# Patient Record
Sex: FEMALE | Race: WHITE | NOT HISPANIC OR LATINO | Employment: PART TIME | URBAN - METROPOLITAN AREA
[De-identification: names, ages, dates, MRNs, and addresses within clinical notes are randomized per-mention and may not be internally consistent; named-entity substitution may affect disease eponyms.]

---

## 2018-03-29 ENCOUNTER — APPOINTMENT (OUTPATIENT)
Dept: RADIOLOGY | Age: 33
End: 2018-03-29
Attending: FAMILY MEDICINE
Payer: COMMERCIAL

## 2018-03-29 ENCOUNTER — APPOINTMENT (OUTPATIENT)
Dept: LAB | Age: 33
End: 2018-03-29
Attending: FAMILY MEDICINE
Payer: COMMERCIAL

## 2018-03-29 ENCOUNTER — OFFICE VISIT (OUTPATIENT)
Dept: FAMILY MEDICINE | Facility: CLINIC | Age: 33
End: 2018-03-29
Payer: COMMERCIAL

## 2018-03-29 VITALS
HEIGHT: 67 IN | TEMPERATURE: 98 F | BODY MASS INDEX: 29.35 KG/M2 | HEART RATE: 67 BPM | DIASTOLIC BLOOD PRESSURE: 88 MMHG | OXYGEN SATURATION: 99 % | WEIGHT: 187 LBS | RESPIRATION RATE: 16 BRPM | SYSTOLIC BLOOD PRESSURE: 134 MMHG

## 2018-03-29 DIAGNOSIS — R63.5 WEIGHT GAIN: ICD-10-CM

## 2018-03-29 DIAGNOSIS — E04.2 MULTIPLE THYROID NODULES: Primary | ICD-10-CM

## 2018-03-29 DIAGNOSIS — Z00.00 PHYSICAL EXAM, ANNUAL: ICD-10-CM

## 2018-03-29 DIAGNOSIS — R53.83 FATIGUE, UNSPECIFIED TYPE: ICD-10-CM

## 2018-03-29 LAB
ALBUMIN SERPL-MCNC: 4.1 G/DL (ref 3.4–5)
ALP SERPL-CCNC: 59 IU/L (ref 35–126)
ALT SERPL-CCNC: 30 IU/L (ref 11–54)
ANION GAP SERPL CALC-SCNC: 7 MEQ/L (ref 3–15)
AST SERPL-CCNC: 24 IU/L (ref 15–41)
BASOPHILS # BLD: 0.05 K/UL (ref 0.01–0.1)
BASOPHILS NFR BLD: 0.7 %
BILIRUB SERPL-MCNC: 0.6 MG/DL (ref 0.3–1.2)
BILIRUB UR QL STRIP.AUTO: NEGATIVE MG/DL
BUN SERPL-MCNC: 11 MG/DL (ref 8–20)
CALCIUM SERPL-MCNC: 9.3 MG/DL (ref 8.9–10.3)
CHLORIDE SERPL-SCNC: 102 MMOL/L (ref 98–109)
CHOLEST SERPL-MCNC: 149 MG/DL
CLARITY UR REFRACT.AUTO: CLEAR
CO2 SERPL-SCNC: 26 MMOL/L (ref 22–32)
COLOR UR AUTO: YELLOW
CREAT SERPL-MCNC: 0.9 MG/DL (ref 0.6–1.1)
EOSINOPHIL # BLD: 0.11 K/UL (ref 0.04–0.36)
EOSINOPHIL NFR BLD: 1.5 %
ERYTHROCYTE [DISTWIDTH] IN BLOOD BY AUTOMATED COUNT: 13.8 % (ref 11.7–14.4)
GFR SERPL CREATININE-BSD FRML MDRD: >60 ML/MIN/1.73M*2
GLUCOSE SERPL-MCNC: 89 MG/DL (ref 70–99)
GLUCOSE UR STRIP.AUTO-MCNC: NEGATIVE MG/DL
HCT VFR BLDCO AUTO: 42.3 % (ref 35–45)
HDLC SERPL-MCNC: 51 MG/DL
HDLC SERPL: 2.9 {RATIO}
HGB BLD-MCNC: 13.4 G/DL (ref 11.8–15.7)
HGB UR QL STRIP.AUTO: NEGATIVE
IMM GRANULOCYTES # BLD AUTO: 0.03 K/UL (ref 0–0.08)
IMM GRANULOCYTES NFR BLD AUTO: 0.4 %
KETONES UR STRIP.AUTO-MCNC: NEGATIVE MG/DL
LDLC SERPL CALC-MCNC: 83 MG/DL
LEUKOCYTE ESTERASE UR QL STRIP.AUTO: NEGATIVE
LYMPHOCYTES # BLD: 1.94 K/UL (ref 1.2–3.5)
LYMPHOCYTES NFR BLD: 26.6 %
MCH RBC QN AUTO: 28.5 PG (ref 28–33.2)
MCHC RBC AUTO-ENTMCNC: 31.7 G/DL (ref 32.2–35.5)
MCV RBC AUTO: 89.8 FL (ref 83–98)
MONOCYTES # BLD: 0.58 K/UL (ref 0.28–0.8)
MONOCYTES NFR BLD: 8 %
NEUTROPHILS # BLD: 4.57 K/UL (ref 1.7–7)
NEUTS SEG NFR BLD: 62.8 %
NITRITE UR QL STRIP.AUTO: NEGATIVE
NONHDLC SERPL-MCNC: 98 MG/DL
NRBC BLD-RTO: 0 %
PDW BLD AUTO: 10.8 FL (ref 9.4–12.3)
PH UR STRIP.AUTO: 6.5 [PH]
PLATELET # BLD AUTO: 312 K/UL (ref 150–369)
POTASSIUM SERPL-SCNC: 4.2 MMOL/L (ref 3.6–5.1)
PROT SERPL-MCNC: 6.9 G/DL (ref 6–8.2)
PROT UR QL STRIP.AUTO: NEGATIVE
RBC # BLD AUTO: 4.71 M/UL (ref 3.93–5.22)
SODIUM SERPL-SCNC: 135 MMOL/L (ref 136–144)
SP GR UR REFRACT.AUTO: 1.01
T3 SERPL-MCNC: 113 NG/ML (ref 87–178)
T4 FREE SERPL-MCNC: 0.93 NG/DL (ref 0.58–1.64)
TRIGL SERPL-MCNC: 73 MG/DL (ref 30–149)
TSH SERPL DL<=0.05 MIU/L-ACNC: 1.8 MIU/ML (ref 0.34–5.6)
UROBILINOGEN UR STRIP-ACNC: 0.2 EU/DL
WBC # BLD AUTO: 7.28 K/UL (ref 3.8–10.5)

## 2018-03-29 PROCEDURE — 84443 ASSAY THYROID STIM HORMONE: CPT | Performed by: FAMILY MEDICINE

## 2018-03-29 PROCEDURE — 80061 LIPID PANEL: CPT | Performed by: FAMILY MEDICINE

## 2018-03-29 PROCEDURE — 80053 COMPREHEN METABOLIC PANEL: CPT | Performed by: FAMILY MEDICINE

## 2018-03-29 PROCEDURE — 76536 US EXAM OF HEAD AND NECK: CPT

## 2018-03-29 PROCEDURE — 36415 COLL VENOUS BLD VENIPUNCTURE: CPT | Performed by: FAMILY MEDICINE

## 2018-03-29 PROCEDURE — 85025 COMPLETE CBC W/AUTO DIFF WBC: CPT | Performed by: FAMILY MEDICINE

## 2018-03-29 PROCEDURE — 81003 URINALYSIS AUTO W/O SCOPE: CPT | Performed by: FAMILY MEDICINE

## 2018-03-29 PROCEDURE — 99214 OFFICE O/P EST MOD 30 MIN: CPT | Performed by: FAMILY MEDICINE

## 2018-03-29 PROCEDURE — 84439 ASSAY OF FREE THYROXINE: CPT | Performed by: FAMILY MEDICINE

## 2018-03-29 PROCEDURE — 84480 ASSAY TRIIODOTHYRONINE (T3): CPT | Performed by: FAMILY MEDICINE

## 2018-03-29 RX ORDER — NORETHINDRONE ACETATE AND ETHINYL ESTRADIOL 1MG-20(21)
KIT ORAL
COMMUNITY
Start: 2015-01-09 | End: 2023-01-09

## 2018-03-29 RX ORDER — TOBRAMYCIN 3 MG/ML
0.3 SOLUTION/ DROPS OPHTHALMIC
COMMUNITY
Start: 2017-01-03 | End: 2023-01-09

## 2018-03-30 ENCOUNTER — TELEPHONE (OUTPATIENT)
Dept: FAMILY MEDICINE | Facility: CLINIC | Age: 33
End: 2018-03-30

## 2018-03-30 ASSESSMENT — ENCOUNTER SYMPTOMS
CHILLS: 0
PALPITATIONS: 0
SLEEP DISTURBANCE: 1
ABDOMINAL PAIN: 0
DYSURIA: 0
HEADACHES: 0
APPETITE CHANGE: 0
ARTHRALGIAS: 1
DIARRHEA: 0
SHORTNESS OF BREATH: 0
NERVOUS/ANXIOUS: 1
CONSTIPATION: 0
NEUROLOGICAL NEGATIVE: 1
FATIGUE: 1
FEVER: 0
COUGH: 0
JOINT SWELLING: 1
BACK PAIN: 1

## 2018-03-30 NOTE — TELEPHONE ENCOUNTER
----- Message from Jerson Arizmendi DO sent at 3/30/2018 11:17 AM EDT -----  Thyroid ultrasound again shows no change.  No follow-up needed.

## 2018-03-30 NOTE — TELEPHONE ENCOUNTER
----- Message from Jerson Arizmendi DO sent at 3/30/2018 11:18 AM EDT -----  Lab including thyroid panel was completely normal.

## 2018-03-30 NOTE — PROGRESS NOTES
"Subjective      Patient ID: Lupe Segal is a 32 y.o. female.    She returns today with multiple issues significant stress.  She complains of fatigue, feeling rundown and her hair is thinning.  She has suffered multiple joint problems was recently dislocated her left hip.  She has not dislocated her knee she was 11 years old numerous problems since.  She has seen rheumatology past.  Of interest and of concern is that her daughter is 4 years old was just diagnosed with Ehler-Danlos Syndrome and is under the care of physicians at [facility].  She has known multiple thyroid nodules she has seen endocrinology.        The following have been reviewed and updated as appropriate in this visit:       Review of Systems   Constitutional: Positive for fatigue. Negative for appetite change, chills and fever.   HENT: Negative.    Eyes: Negative for visual disturbance.   Respiratory: Negative for cough and shortness of breath.    Cardiovascular: Negative for chest pain and palpitations.   Gastrointestinal: Negative for abdominal pain, constipation and diarrhea.   Genitourinary: Negative for dysuria.   Musculoskeletal: Positive for arthralgias, back pain and joint swelling.   Skin: Negative for rash.   Neurological: Negative.  Negative for headaches.   Psychiatric/Behavioral: Positive for sleep disturbance. The patient is nervous/anxious.        Objective     Vitals:    03/29/18 0853   BP: 134/88   BP Location: Right upper arm   Patient Position: Sitting   Pulse: 67   Resp: 16   Temp: 36.7 °C (98 °F)   TempSrc: Oral   SpO2: 99%   Weight: 84.8 kg (187 lb)   Height: 1.702 m (5' 7\")     Body mass index is 29.29 kg/m².    Physical Exam   Constitutional: She is oriented to person, place, and time. She appears well-developed and well-nourished.   HENT:   Head: Normocephalic.   Eyes: Conjunctivae are normal.   Neck: Neck supple. No thyromegaly present.   Cardiovascular: Normal rate, regular rhythm and normal heart sounds.  "   Pulmonary/Chest: Effort normal and breath sounds normal.   Musculoskeletal: She exhibits no edema.   Lymphadenopathy:     She has no cervical adenopathy.   Neurological: She is alert and oriented to person, place, and time.   Skin: Skin is warm and dry.   Psychiatric: She has a normal mood and affect. Her behavior is normal. Judgment and thought content normal.   Nursing note and vitals reviewed.      Assessment/Plan   Problem List Items Addressed This Visit     None      Visit Diagnoses     Multiple thyroid nodules    -  Primary  Rec repeat Us.  If still no change, no further eval needed.    Relevant Orders    ULTRASOUND THYROID (Completed)    Urinalysis with Reflex Culture (Completed)    T3 (Completed)    T4, free (Completed)    UA Reflex to Culture (Macroscopic) (Completed)    Weight gain    Stress is a big factor.  Rec consult with  at [place] to see what services are available to her for her likely dx of EDS as well.        Fatigue, unspecified type        Physical exam, annual        Relevant Orders    CBC and differential (Completed)    Comprehensive metabolic panel (Completed)    Lipid panel (Completed)    TSH (Completed)    Urinalysis with Reflex Culture (Completed)    T3 (Completed)    T4, free (Completed)    CBC (Completed)    Diff Count (Completed)    UA Reflex to Culture (Macroscopic) (Completed)

## 2021-03-31 DIAGNOSIS — Z23 ENCOUNTER FOR IMMUNIZATION: ICD-10-CM

## 2021-07-23 ENCOUNTER — OFFICE VISIT (OUTPATIENT)
Dept: FAMILY MEDICINE | Facility: CLINIC | Age: 36
End: 2021-07-23
Payer: COMMERCIAL

## 2021-07-23 VITALS
HEIGHT: 67 IN | OXYGEN SATURATION: 97 % | RESPIRATION RATE: 18 BRPM | WEIGHT: 180.6 LBS | BODY MASS INDEX: 28.35 KG/M2 | TEMPERATURE: 97.5 F | DIASTOLIC BLOOD PRESSURE: 84 MMHG | SYSTOLIC BLOOD PRESSURE: 122 MMHG | HEART RATE: 75 BPM

## 2021-07-23 DIAGNOSIS — Z00.00 ENCOUNTER FOR GENERAL ADULT MEDICAL EXAMINATION WITHOUT ABNORMAL FINDINGS: Primary | ICD-10-CM

## 2021-07-23 DIAGNOSIS — Z86.39 HISTORY OF THYROID NODULE: ICD-10-CM

## 2021-07-23 DIAGNOSIS — Q79.60 EHLERS-DANLOS SYNDROME: ICD-10-CM

## 2021-07-23 PROCEDURE — 99395 PREV VISIT EST AGE 18-39: CPT | Performed by: FAMILY MEDICINE

## 2021-07-23 PROCEDURE — 3008F BODY MASS INDEX DOCD: CPT | Performed by: FAMILY MEDICINE

## 2021-07-23 ASSESSMENT — ENCOUNTER SYMPTOMS
LIGHT-HEADEDNESS: 0
ARTHRALGIAS: 0
COLOR CHANGE: 0
CHEST TIGHTNESS: 0
DIZZINESS: 0
RHINORRHEA: 0
WHEEZING: 0
FEVER: 0
DIARRHEA: 0
BACK PAIN: 0
SHORTNESS OF BREATH: 0
ABDOMINAL PAIN: 0
DIFFICULTY URINATING: 0
SINUS PRESSURE: 0
FATIGUE: 0
VOMITING: 0
HEADACHES: 0
SORE THROAT: 0
APPETITE CHANGE: 0
NAUSEA: 0

## 2021-07-23 NOTE — PATIENT INSTRUCTIONS
Patient Education     Preventive Care 21-39 Years Old, Female  Preventive care refers to visits with your health care provider and lifestyle choices that can promote health and wellness. This includes:  · A yearly physical exam. This may also be called an annual well check.  · Regular dental visits and eye exams.  · Immunizations.  · Screening for certain conditions.  · Healthy lifestyle choices, such as eating a healthy diet, getting regular exercise, not using drugs or products that contain nicotine and tobacco, and limiting alcohol use.  What can I expect for my preventive care visit?  Physical exam  Your health care provider will check your:  · Height and weight. This may be used to calculate body mass index (BMI), which tells if you are at a healthy weight.  · Heart rate and blood pressure.  · Skin for abnormal spots.  Counseling  Your health care provider may ask you questions about your:  · Alcohol, tobacco, and drug use.  · Emotional well-being.  · Home and relationship well-being.  · Sexual activity.  · Eating habits.  · Work and work environment.  · Method of birth control.  · Menstrual cycle.  · Pregnancy history.  What immunizations do I need?    Influenza (flu) vaccine  · This is recommended every year.  Tetanus, diphtheria, and pertussis (Tdap) vaccine  · You may need a Td booster every 10 years.  Varicella (chickenpox) vaccine  · You may need this if you have not been vaccinated.  Human papillomavirus (HPV) vaccine  · If recommended by your health care provider, you may need three doses over 6 months.  Measles, mumps, and rubella (MMR) vaccine  · You may need at least one dose of MMR. You may also need a second dose.  Meningococcal conjugate (MenACWY) vaccine  · One dose is recommended if you are age 19-21 years and a first-year college student living in a residence harding, or if you have one of several medical conditions. You may also need additional booster doses.  Pneumococcal conjugate (PCV13)  vaccine  · You may need this if you have certain conditions and were not previously vaccinated.  Pneumococcal polysaccharide (PPSV23) vaccine  · You may need one or two doses if you smoke cigarettes or if you have certain conditions.  Hepatitis A vaccine  · You may need this if you have certain conditions or if you travel or work in places where you may be exposed to hepatitis A.  Hepatitis B vaccine  · You may need this if you have certain conditions or if you travel or work in places where you may be exposed to hepatitis B.  Haemophilus influenzae type b (Hib) vaccine  · You may need this if you have certain conditions.  You may receive vaccines as individual doses or as more than one vaccine together in one shot (combination vaccines). Talk with your health care provider about the risks and benefits of combination vaccines.  What tests do I need?    Blood tests  · Lipid and cholesterol levels. These may be checked every 5 years starting at age 20.  · Hepatitis C test.  · Hepatitis B test.  Screening  · Diabetes screening. This is done by checking your blood sugar (glucose) after you have not eaten for a while (fasting).  · Sexually transmitted disease (STD) testing.  · BRCA-related cancer screening. This may be done if you have a family history of breast, ovarian, tubal, or peritoneal cancers.  · Pelvic exam and Pap test. This may be done every 3 years starting at age 21. Starting at age 30, this may be done every 5 years if you have a Pap test in combination with an HPV test.  Talk with your health care provider about your test results, treatment options, and if necessary, the need for more tests.  Follow these instructions at home:  Eating and drinking    · Eat a diet that includes fresh fruits and vegetables, whole grains, lean protein, and low-fat dairy.  · Take vitamin and mineral supplements as recommended by your health care provider.  · Do not drink alcohol if:  ? Your health care provider tells you not  to drink.  ? You are pregnant, may be pregnant, or are planning to become pregnant.  · If you drink alcohol:  ? Limit how much you have to 0-1 drink a day.  ? Be aware of how much alcohol is in your drink. In the [place] one drink equals one 12 oz bottle of beer (355 mL), one 5 oz glass of wine (148 mL), or one 1½ oz glass of hard liquor (44 mL).  Lifestyle  · Take daily care of your teeth and gums.  · Stay active. Exercise for at least 30 minutes on 5 or more days each week.  · Do not use any products that contain nicotine or tobacco, such as cigarettes, e-cigarettes, and chewing tobacco. If you need help quitting, ask your health care provider.  · If you are sexually active, practice safe sex. Use a condom or other form of birth control (contraception) in order to prevent pregnancy and STIs (sexually transmitted infections). If you plan to become pregnant, see your health care provider for a preconception visit.  What's next?  · Visit your health care provider once a year for a well check visit.  · Ask your health care provider how often you should have your eyes and teeth checked.  · Stay up to date on all vaccines.  This information is not intended to replace advice given to you by your health care provider. Make sure you discuss any questions you have with your health care provider.  Document Released: 02/13/2003 Document Revised: 08/29/2019 Document Reviewed: 08/29/2019  [employer] Patient Education © 2020 [employer]

## 2021-07-23 NOTE — PROGRESS NOTES
[facility]  [address] [place] [address]  [phone]     Reason for visit:   Chief Complaint   Patient presents with   • Annual Exam     PE, overall health       HPI   Lupe Segal is a 36 y.o. female who presents for a routine well visit.        Employed- RN  Fun activities - spending time with family. 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 Unable to plan her meals.  Doesn't smoke, no smokers at home.  Alcohol - socially  Drugs - no   Menses - regular  Sleep - 7 hours of sleep  Last dental visit - overdue  Ophthalmology - overdue  Seatbelt use - yes  Sexually active - yes  Mammogram - paternal aunt diagnosed at late 60s  Colonoscopy - No risk factors. Screening starts at 49yo.  Cervical Cancer Screening - routine follow up OB/GYN    History of EDS - officially diagnosed 3 years ago.     Social History:  Social History     Social History Narrative   • Not on file       Medical History:  Past Medical History:   Diagnosis Date   • Shala-Danlos syndrome        Surgical History:  Past Surgical History:   Procedure Laterality Date   • KNEE LIGAMENT RECONSTRUCTION     • WISDOM TOOTH EXTRACTION           Family History:  History reviewed. No pertinent family history.    Allergies:  Azithromycin, Codeine, and Morphine    Current Medications:  Current Outpatient Medications   Medication Sig Dispense Refill   • multivitamin-Ca-iron-minerals tablet Take 1 tablet by mouth daily.     • norethindrone-e.estradiol-iron (LOESTRIN FE 1/20, 28-DAY,) 1 mg-20 mcg (21)/75 mg (7) per tablet take 1 tablet by oral route  every day     • prenat.vits,jack,min-iron-folic (PRENATAL VITAMIN) tablet take 1 tablet by oral route  every day     • tobramycin (TOBREX) 0.3 % ophthalmic solution 0.3 %.       No current facility-administered medications for this visit.       Review of Systems:  Review of Systems    Constitutional: Negative for appetite change, fatigue and fever.   HENT: Negative for hearing loss, rhinorrhea, sinus pressure and sore throat.    Eyes: Negative for visual disturbance.   Respiratory: Negative for chest tightness, shortness of breath and wheezing.    Cardiovascular: Negative for chest pain.   Gastrointestinal: Negative for abdominal pain, diarrhea, nausea and vomiting.   Genitourinary: Negative for difficulty urinating.   Musculoskeletal: Negative for arthralgias and back pain.   Skin: Negative for color change.   Neurological: Negative for dizziness, light-headedness and headaches.   Psychiatric/Behavioral: Negative for behavioral problems.       Objective     Vital Signs:  Vitals:    07/23/21 0928   BP: 122/84   Pulse: 75   Resp: 18   Temp: 36.4 °C (97.5 °F)   SpO2: 97%       BMI:  Body mass index is 28.29 kg/m².     Physical Exam  Vitals and nursing note reviewed.   Constitutional:       Appearance: She is well-developed.   HENT:      Head: Normocephalic and atraumatic.      Right Ear: Tympanic membrane, ear canal and external ear normal.      Left Ear: Tympanic membrane, ear canal and external ear normal.   Eyes:      Pupils: Pupils are equal, round, and reactive to light.   Cardiovascular:      Rate and Rhythm: Normal rate and regular rhythm.      Heart sounds: Normal heart sounds. No murmur heard.   No friction rub.   Pulmonary:      Effort: Pulmonary effort is normal. No respiratory distress.      Breath sounds: Normal breath sounds. No wheezing or rales.   Abdominal:      General: Bowel sounds are normal.      Palpations: Abdomen is soft.      Tenderness: There is no abdominal tenderness. There is no guarding.   Musculoskeletal:         General: Normal range of motion.      Cervical back: Normal range of motion and neck supple.      Right lower leg: No edema.      Left lower leg: No edema.   Lymphadenopathy:      Cervical: No cervical adenopathy.   Skin:     General: Skin is warm and dry.    Neurological:      Mental Status: She is alert and oriented to person, place, and time.   Psychiatric:         Behavior: Behavior normal.         Recent labs before today:     Lab Results   Component Value Date    WBC 7.28 03/29/2018    HGB 13.4 03/29/2018    HCT 42.3 03/29/2018     03/29/2018    CHOL 149 03/29/2018    TRIG 73 03/29/2018    HDL 51 (L) 03/29/2018    ALT 30 03/29/2018    AST 24 03/29/2018     (L) 03/29/2018    K 4.2 03/29/2018     03/29/2018    CREATININE 0.9 03/29/2018    BUN 11 03/29/2018    CO2 26 03/29/2018    TSH 1.80 03/29/2018        Procedures   Assessment     Patient Instructions     Patient Education     Preventive Care 21-39 Years Old, Female  Preventive care refers to visits with your health care provider and lifestyle choices that can promote health and wellness. This includes:  · A yearly physical exam. This may also be called an annual well check.  · Regular dental visits and eye exams.  · Immunizations.  · Screening for certain conditions.  · Healthy lifestyle choices, such as eating a healthy diet, getting regular exercise, not using drugs or products that contain nicotine and tobacco, and limiting alcohol use.  What can I expect for my preventive care visit?  Physical exam  Your health care provider will check your:  · Height and weight. This may be used to calculate body mass index (BMI), which tells if you are at a healthy weight.  · Heart rate and blood pressure.  · Skin for abnormal spots.  Counseling  Your health care provider may ask you questions about your:  · Alcohol, tobacco, and drug use.  · Emotional well-being.  · Home and relationship well-being.  · Sexual activity.  · Eating habits.  · Work and work environment.  · Method of birth control.  · Menstrual cycle.  · Pregnancy history.  What immunizations do I need?    Influenza (flu) vaccine  · This is recommended every year.  Tetanus, diphtheria, and pertussis (Tdap) vaccine  · You may need a Td  booster every 10 years.  Varicella (chickenpox) vaccine  · You may need this if you have not been vaccinated.  Human papillomavirus (HPV) vaccine  · If recommended by your health care provider, you may need three doses over 6 months.  Measles, mumps, and rubella (MMR) vaccine  · You may need at least one dose of MMR. You may also need a second dose.  Meningococcal conjugate (MenACWY) vaccine  · One dose is recommended if you are age 19-21 years and a first-year college student living in a residence harding, or if you have one of several medical conditions. You may also need additional booster doses.  Pneumococcal conjugate (PCV13) vaccine  · You may need this if you have certain conditions and were not previously vaccinated.  Pneumococcal polysaccharide (PPSV23) vaccine  · You may need one or two doses if you smoke cigarettes or if you have certain conditions.  Hepatitis A vaccine  · You may need this if you have certain conditions or if you travel or work in places where you may be exposed to hepatitis A.  Hepatitis B vaccine  · You may need this if you have certain conditions or if you travel or work in places where you may be exposed to hepatitis B.  Haemophilus influenzae type b (Hib) vaccine  · You may need this if you have certain conditions.  You may receive vaccines as individual doses or as more than one vaccine together in one shot (combination vaccines). Talk with your health care provider about the risks and benefits of combination vaccines.  What tests do I need?    Blood tests  · Lipid and cholesterol levels. These may be checked every 5 years starting at age 20.  · Hepatitis C test.  · Hepatitis B test.  Screening  · Diabetes screening. This is done by checking your blood sugar (glucose) after you have not eaten for a while (fasting).  · Sexually transmitted disease (STD) testing.  · BRCA-related cancer screening. This may be done if you have a family history of breast, ovarian, tubal, or peritoneal  cancers.  · Pelvic exam and Pap test. This may be done every 3 years starting at age 21. Starting at age 30, this may be done every 5 years if you have a Pap test in combination with an HPV test.  Talk with your health care provider about your test results, treatment options, and if necessary, the need for more tests.  Follow these instructions at home:  Eating and drinking    · Eat a diet that includes fresh fruits and vegetables, whole grains, lean protein, and low-fat dairy.  · Take vitamin and mineral supplements as recommended by your health care provider.  · Do not drink alcohol if:  ? Your health care provider tells you not to drink.  ? You are pregnant, may be pregnant, or are planning to become pregnant.  · If you drink alcohol:  ? Limit how much you have to 0-1 drink a day.  ? Be aware of how much alcohol is in your drink. In the [place] one drink equals one 12 oz bottle of beer (355 mL), one 5 oz glass of wine (148 mL), or one 1½ oz glass of hard liquor (44 mL).  Lifestyle  · Take daily care of your teeth and gums.  · Stay active. Exercise for at least 30 minutes on 5 or more days each week.  · Do not use any products that contain nicotine or tobacco, such as cigarettes, e-cigarettes, and chewing tobacco. If you need help quitting, ask your health care provider.  · If you are sexually active, practice safe sex. Use a condom or other form of birth control (contraception) in order to prevent pregnancy and STIs (sexually transmitted infections). If you plan to become pregnant, see your health care provider for a preconception visit.  What's next?  · Visit your health care provider once a year for a well check visit.  · Ask your health care provider how often you should have your eyes and teeth checked.  · Stay up to date on all vaccines.  This information is not intended to replace advice given to you by your health care provider. Make sure you discuss any questions you have with your health care  provider.  Document Released: 02/13/2003 Document Revised: 08/29/2019 Document Reviewed: 08/29/2019  [employer] Patient Education © 2020 [employer]           Problem List Items Addressed This Visit        Endocrine/Metabolic    Shala-Danlos syndrome     Stable. referred to cardiology for annual check up. she follows up with ophthalmologist and annaul eye exams. Follows up with ortho for scoliosis         Relevant Orders    Ambulatory referral to Cardiology       Other    History of thyroid nodule     will check thryoid panel         Relevant Orders    TSH w reflex FT4      Other Visit Diagnoses     Encounter for general adult medical examination without abnormal findings    -  Primary    Relevant Orders    Comprehensive metabolic panel    CBC and Differential    Lipid panel    TSH w reflex FT4      Today we discussed a healthy diet with fruits, vegetables, and low-fat items with a focus on complex carbohydrates. Regular physical activity is encouraged with a goal of 30 minutes of cardio most days of the week with some muscle strengthening.    Try to limit caffeine to less than 24 ounces per day, replenish with plenty of water. Hydration has a bigger impact on your health than you think!    Reviewed safe alcohol habits. If you drink while out of the house, plan for a designated . Never drink and drive. Avoid THC use, smoking, or vaping.    Reviewed safe sexual practices, if sexually active always use a condom to discourage STD transmission.     Routine assessments by specialists such as OBGYN, cardiology, GI, etc. were encouraged if applicable.     Watch for changes in bowel habits, especially black or bloody stools.     Patient is UTD with immunizations.  Encouraged yearly flu shot in Oct/Nov.     Fasting labs, follow up based on results.             Og Nina, DO  7/23/2021

## 2021-07-23 NOTE — ASSESSMENT & PLAN NOTE
Stable. referred to cardiology for annual check up. she follows up with ophthalmologist and annaul eye exams. Follows up with ortho for scoliosis

## 2021-09-20 ENCOUNTER — TELEPHONE (OUTPATIENT)
Dept: FAMILY MEDICINE | Facility: CLINIC | Age: 36
End: 2021-09-20

## 2021-09-20 DIAGNOSIS — E66.3 PATIENT OVERWEIGHT: Primary | ICD-10-CM

## 2021-09-20 NOTE — TELEPHONE ENCOUNTER
"Mera calling from [employer] [phone]  Fax [phone]    Asking for a \"script\" with dx code E66.3 for nutrition/dietician svs. Please call /[email or web address]  "

## 2021-10-06 ENCOUNTER — TRANSCRIBE ORDERS (OUTPATIENT)
Dept: LAB | Age: 36
End: 2021-10-06
Payer: COMMERCIAL

## 2021-10-06 ENCOUNTER — APPOINTMENT (OUTPATIENT)
Dept: LAB | Age: 36
End: 2021-10-06
Attending: FAMILY MEDICINE
Payer: COMMERCIAL

## 2021-10-06 DIAGNOSIS — L56.2 PHOTOCONTACT DERMATITIS (BERLOQUE DERMATITIS): Primary | ICD-10-CM

## 2021-10-06 DIAGNOSIS — Z86.39 HISTORY OF THYROID NODULE: ICD-10-CM

## 2021-10-06 DIAGNOSIS — Z00.00 ENCOUNTER FOR GENERAL ADULT MEDICAL EXAMINATION WITHOUT ABNORMAL FINDINGS: ICD-10-CM

## 2021-10-06 DIAGNOSIS — L56.2 PHOTOCONTACT DERMATITIS (BERLOQUE DERMATITIS): ICD-10-CM

## 2021-10-06 LAB
ALBUMIN SERPL-MCNC: 3.8 G/DL (ref 3.4–5)
ALP SERPL-CCNC: 55 IU/L (ref 35–126)
ALT SERPL-CCNC: 20 IU/L (ref 11–54)
ANION GAP SERPL CALC-SCNC: 11 MEQ/L (ref 3–15)
AST SERPL-CCNC: 19 IU/L (ref 15–41)
BASOPHILS # BLD: 0.06 K/UL (ref 0.01–0.1)
BASOPHILS NFR BLD: 0.9 %
BILIRUB SERPL-MCNC: 0.5 MG/DL (ref 0.3–1.2)
BUN SERPL-MCNC: 10 MG/DL (ref 8–20)
CALCIUM SERPL-MCNC: 9.9 MG/DL (ref 8.9–10.3)
CHLORIDE SERPL-SCNC: 101 MEQ/L (ref 98–109)
CHOLEST SERPL-MCNC: 162 MG/DL
CO2 SERPL-SCNC: 27 MEQ/L (ref 22–32)
CREAT SERPL-MCNC: 1 MG/DL (ref 0.6–1.1)
DIFFERENTIAL METHOD BLD: ABNORMAL
EOSINOPHIL # BLD: 0.1 K/UL (ref 0.04–0.36)
EOSINOPHIL NFR BLD: 1.5 %
ERYTHROCYTE [DISTWIDTH] IN BLOOD BY AUTOMATED COUNT: 12.8 % (ref 11.7–14.4)
GFR SERPL CREATININE-BSD FRML MDRD: >60 ML/MIN/1.73M*2
GLUCOSE SERPL-MCNC: 97 MG/DL (ref 70–99)
HCT VFR BLDCO AUTO: 44.5 % (ref 35–45)
HDLC SERPL-MCNC: 48 MG/DL
HDLC SERPL: 3.4 {RATIO}
HGB BLD-MCNC: 13.9 G/DL (ref 11.8–15.7)
IMM GRANULOCYTES # BLD AUTO: 0.02 K/UL (ref 0–0.08)
IMM GRANULOCYTES NFR BLD AUTO: 0.3 %
LDLC SERPL CALC-MCNC: 105 MG/DL
LYMPHOCYTES # BLD: 1.61 K/UL (ref 1.2–3.5)
LYMPHOCYTES NFR BLD: 24.3 %
MCH RBC QN AUTO: 27.9 PG (ref 28–33.2)
MCHC RBC AUTO-ENTMCNC: 31.2 G/DL (ref 32.2–35.5)
MCV RBC AUTO: 89.2 FL (ref 83–98)
MONOCYTES # BLD: 0.68 K/UL (ref 0.28–0.8)
MONOCYTES NFR BLD: 10.3 %
NEUTROPHILS # BLD: 4.15 K/UL (ref 1.7–7)
NEUTS SEG NFR BLD: 62.7 %
NONHDLC SERPL-MCNC: 114 MG/DL
NRBC BLD-RTO: 0 %
PDW BLD AUTO: 10.4 FL (ref 9.4–12.3)
PLATELET # BLD AUTO: 349 K/UL (ref 150–369)
POTASSIUM SERPL-SCNC: 4.8 MEQ/L (ref 3.6–5.1)
PROT SERPL-MCNC: 6.8 G/DL (ref 6–8.2)
RBC # BLD AUTO: 4.99 M/UL (ref 3.93–5.22)
SODIUM SERPL-SCNC: 139 MEQ/L (ref 136–144)
TRIGL SERPL-MCNC: 47 MG/DL (ref 30–149)
TSH SERPL DL<=0.05 MIU/L-ACNC: 1.08 MIU/L (ref 0.34–5.6)
WBC # BLD AUTO: 6.62 K/UL (ref 3.8–10.5)

## 2021-10-06 PROCEDURE — 86038 ANTINUCLEAR ANTIBODIES: CPT

## 2021-10-06 PROCEDURE — 82040 ASSAY OF SERUM ALBUMIN: CPT

## 2021-10-06 PROCEDURE — 84443 ASSAY THYROID STIM HORMONE: CPT

## 2021-10-06 PROCEDURE — 36415 COLL VENOUS BLD VENIPUNCTURE: CPT

## 2021-10-06 PROCEDURE — 85025 COMPLETE CBC W/AUTO DIFF WBC: CPT

## 2021-10-06 PROCEDURE — 80061 LIPID PANEL: CPT

## 2021-10-07 LAB — ANA SER QL IA: NEGATIVE

## 2023-01-09 ENCOUNTER — OFFICE VISIT (OUTPATIENT)
Dept: FAMILY MEDICINE | Facility: CLINIC | Age: 38
End: 2023-01-09
Payer: COMMERCIAL

## 2023-01-09 VITALS
BODY MASS INDEX: 29.13 KG/M2 | DIASTOLIC BLOOD PRESSURE: 86 MMHG | RESPIRATION RATE: 18 BRPM | OXYGEN SATURATION: 99 % | TEMPERATURE: 97.7 F | SYSTOLIC BLOOD PRESSURE: 124 MMHG | HEART RATE: 92 BPM | WEIGHT: 186 LBS

## 2023-01-09 DIAGNOSIS — Z00.00 ENCOUNTER FOR GENERAL ADULT MEDICAL EXAMINATION WITHOUT ABNORMAL FINDINGS: Primary | ICD-10-CM

## 2023-01-09 DIAGNOSIS — Q79.60 EHLERS-DANLOS SYNDROME: ICD-10-CM

## 2023-01-09 PROCEDURE — 99395 PREV VISIT EST AGE 18-39: CPT | Performed by: FAMILY MEDICINE

## 2023-01-09 PROCEDURE — 3008F BODY MASS INDEX DOCD: CPT | Performed by: FAMILY MEDICINE

## 2023-01-09 ASSESSMENT — ENCOUNTER SYMPTOMS
BACK PAIN: 0
CHEST TIGHTNESS: 0
FEVER: 0
COLOR CHANGE: 0
DIARRHEA: 0
SHORTNESS OF BREATH: 0
LIGHT-HEADEDNESS: 0
APPETITE CHANGE: 0
NAUSEA: 0
ARTHRALGIAS: 0
SORE THROAT: 0
DIFFICULTY URINATING: 0
VOMITING: 0
RHINORRHEA: 0
HEADACHES: 0
SINUS PRESSURE: 0
WHEEZING: 0
FATIGUE: 0
ABDOMINAL PAIN: 0
DIZZINESS: 0

## 2023-01-09 ASSESSMENT — PATIENT HEALTH QUESTIONNAIRE - PHQ9: SUM OF ALL RESPONSES TO PHQ9 QUESTIONS 1 & 2: 0

## 2023-01-09 NOTE — PROGRESS NOTES
[facility]  [address] [place] [address]  [phone]     Reason for visit:   Chief Complaint   Patient presents with   • Annual Exam     PE, no concerns       HPI   Lupe Segal is a 37 y.o. female who presents for a routine well visit.        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 Unable to plan her meals.  Doesn't smoke, no smokers at home.  Alcohol - socially  Drugs - no   Menses - regular  Sleep - 7 hours of sleep  Last dental visit - overdue  Ophthalmology - overdue  Seatbelt use - yes  Sexually active - yes  Mammogram - paternal aunt diagnosed at late 60s  Colonoscopy - No risk factors. Screening starts at 46yo.  Cervical Cancer Screening - routine follow up OB/GYN     History of EDS - officially diagnosed 4 years ago.        Social History     Socioeconomic History   • Marital status:      Spouse name: Not on file   • Number of children: 2   • Years of education: Not on file   • Highest education level: Not on file   Occupational History   • Not on file   Tobacco Use   • Smoking status: Never   • Smokeless tobacco: Never   Substance and Sexual Activity   • Alcohol use: Yes     Comment: occasionally   • Drug use: Never   • Sexual activity: Yes   Other Topics Concern   • Not on file   Social History Narrative   • Not on file     Social Determinants of Health     Financial Resource Strain: Not on file   Food Insecurity: Not on file   Transportation Needs: Not on file   Physical Activity: Not on file   Stress: Not on file   Social Connections: Not on file   Intimate Partner Violence: Not on file   Housing Stability: Not on file        Medical History:  Past Medical History:   Diagnosis Date   • Shala-Danlos syndrome        Surgical History:  Past Surgical History:   Procedure Laterality Date    • KNEE LIGAMENT RECONSTRUCTION     • WISDOM TOOTH EXTRACTION           Family History:  History reviewed. No pertinent family history.    Allergies:  Azithromycin, Codeine, and Morphine    Current Medications:  Current Outpatient Medications   Medication Sig Dispense Refill   • multivitamin-Ca-iron-minerals tablet Take 1 tablet by mouth daily.       No current facility-administered medications for this visit.       Review of Systems:  Review of Systems   Constitutional: Negative for appetite change, fatigue and fever.   HENT: Negative for hearing loss, rhinorrhea, sinus pressure and sore throat.    Eyes: Negative for visual disturbance.   Respiratory: Negative for chest tightness, shortness of breath and wheezing.    Cardiovascular: Negative for chest pain.   Gastrointestinal: Negative for abdominal pain, diarrhea, nausea and vomiting.   Genitourinary: Negative for difficulty urinating.   Musculoskeletal: Negative for arthralgias and back pain.   Skin: Negative for color change.   Neurological: Negative for dizziness, light-headedness and headaches.   Psychiatric/Behavioral: Negative for behavioral problems.       Objective     Vital Signs:  Vitals:    01/09/23 1204   BP: 124/86   Pulse: 92   Resp: 18   Temp: 36.5 °C (97.7 °F)   SpO2: 99%       BMI:  Body mass index is 29.13 kg/m².     Physical Exam  Vitals and nursing note reviewed.   Constitutional:       Appearance: She is well-developed.   HENT:      Head: Normocephalic and atraumatic.      Right Ear: Tympanic membrane, ear canal and external ear normal.      Left Ear: Tympanic membrane, ear canal and external ear normal.   Eyes:      Pupils: Pupils are equal, round, and reactive to light.   Cardiovascular:      Rate and Rhythm: Normal rate and regular rhythm.      Heart sounds: Normal heart sounds. No murmur heard.    No friction rub.   Pulmonary:      Effort: Pulmonary effort is normal. No respiratory distress.      Breath sounds: Normal breath sounds. No  wheezing or rales.   Abdominal:      General: Bowel sounds are normal.      Palpations: Abdomen is soft.      Tenderness: There is no abdominal tenderness. There is no guarding.   Musculoskeletal:         General: Normal range of motion.      Cervical back: Normal range of motion and neck supple.      Right lower leg: No edema.      Left lower leg: No edema.   Lymphadenopathy:      Cervical: No cervical adenopathy.   Skin:     General: Skin is warm and dry.   Neurological:      Mental Status: She is alert and oriented to person, place, and time.   Psychiatric:         Behavior: Behavior normal.         Recent labs before today:     Lab Results   Component Value Date    WBC 6.62 10/06/2021    HGB 13.9 10/06/2021    HCT 44.5 10/06/2021     10/06/2021    CHOL 162 10/06/2021    TRIG 47 10/06/2021    HDL 48 (L) 10/06/2021    ALT 20 10/06/2021    AST 19 10/06/2021     10/06/2021    K 4.8 10/06/2021     10/06/2021    CREATININE 1.0 10/06/2021    BUN 10 10/06/2021    CO2 27 10/06/2021    TSH 1.08 10/06/2021        Procedures   Assessment     There are no Patient Instructions on file for this visit.    Problem List Items Addressed This Visit        Musculoskeletal    Shala-Danlos syndrome     Stable. Cont to monitor symptoms        Other Visit Diagnoses     Encounter for general adult medical examination without abnormal findings    -  Primary    Relevant Orders    Comprehensive metabolic panel    CBC and Differential    Lipid panel              Today we discussed a healthy diet with fruits, vegetables, and low-fat items with a focus on complex carbohydrates. Regular physical activity is encouraged with a goal of 30 minutes of cardio most days of the week with some muscle strengthening.    Try to limit caffeine to less than 24 ounces per day, replenish with plenty of water. Hydration has a bigger impact on your health than you think!    Reviewed safe alcohol habits. If you drink while out of the house,  plan for a designated . Never drink and drive. Avoid THC use, smoking, or vaping.    Reviewed safe sexual practices, if sexually active always use a condom to discourage STD transmission.     Routine assessments by specialists such as OBGYN, cardiology, GI, etc. were encouraged if applicable.     Watch for changes in bowel habits, especially black or bloody stools.     Patient is UTD with immunizations.     Fasting labs, follow up One year for annual check up.          Og Nina, DO  1/9/2023

## 2023-01-18 ENCOUNTER — TELEPHONE (OUTPATIENT)
Dept: FAMILY MEDICINE | Facility: CLINIC | Age: 38
End: 2023-01-18

## 2023-01-18 NOTE — TELEPHONE ENCOUNTER
Pt started experiencing head cold symptoms the day after OV. Runny & stuffy nose, sore throat & congestion. Pt looking to get meds to help alleviate symptoms. Please assist.

## 2023-05-31 ENCOUNTER — APPOINTMENT (OUTPATIENT)
Dept: LAB | Age: 38
End: 2023-05-31
Attending: FAMILY MEDICINE
Payer: COMMERCIAL

## 2023-05-31 DIAGNOSIS — Z00.00 ENCOUNTER FOR GENERAL ADULT MEDICAL EXAMINATION WITHOUT ABNORMAL FINDINGS: ICD-10-CM

## 2023-05-31 LAB
ALBUMIN SERPL-MCNC: 3.8 G/DL (ref 3.4–5)
ALP SERPL-CCNC: 56 IU/L (ref 35–126)
ALT SERPL-CCNC: 21 IU/L (ref 11–54)
ANION GAP SERPL CALC-SCNC: 7 MEQ/L (ref 3–15)
AST SERPL-CCNC: 19 IU/L (ref 15–41)
BASOPHILS # BLD: 0.05 K/UL (ref 0.01–0.1)
BASOPHILS NFR BLD: 0.9 %
BILIRUB SERPL-MCNC: 0.4 MG/DL (ref 0.3–1.2)
BUN SERPL-MCNC: 11 MG/DL (ref 8–20)
CALCIUM SERPL-MCNC: 9.1 MG/DL (ref 8.9–10.3)
CHLORIDE SERPL-SCNC: 106 MEQ/L (ref 98–109)
CHOLEST SERPL-MCNC: 173 MG/DL
CO2 SERPL-SCNC: 26 MEQ/L (ref 22–32)
CREAT SERPL-MCNC: 0.7 MG/DL (ref 0.6–1.1)
DIFFERENTIAL METHOD BLD: ABNORMAL
EOSINOPHIL # BLD: 0.14 K/UL (ref 0.04–0.36)
EOSINOPHIL NFR BLD: 2.4 %
ERYTHROCYTE [DISTWIDTH] IN BLOOD BY AUTOMATED COUNT: 13.6 % (ref 11.7–14.4)
GFR SERPL CREATININE-BSD FRML MDRD: >60 ML/MIN/1.73M*2
GLUCOSE SERPL-MCNC: 85 MG/DL (ref 70–99)
HCT VFR BLDCO AUTO: 43.1 % (ref 35–45)
HDLC SERPL-MCNC: 61 MG/DL
HDLC SERPL: 2.8 {RATIO}
HGB BLD-MCNC: 13.6 G/DL (ref 11.8–15.7)
IMM GRANULOCYTES # BLD AUTO: 0.01 K/UL (ref 0–0.08)
IMM GRANULOCYTES NFR BLD AUTO: 0.2 %
LDLC SERPL CALC-MCNC: 104 MG/DL
LYMPHOCYTES # BLD: 1.84 K/UL (ref 1.2–3.5)
LYMPHOCYTES NFR BLD: 31.3 %
MCH RBC QN AUTO: 29.1 PG (ref 28–33.2)
MCHC RBC AUTO-ENTMCNC: 31.6 G/DL (ref 32.2–35.5)
MCV RBC AUTO: 92.3 FL (ref 83–98)
MONOCYTES # BLD: 0.57 K/UL (ref 0.28–0.8)
MONOCYTES NFR BLD: 9.7 %
NEUTROPHILS # BLD: 3.27 K/UL (ref 1.7–7)
NEUTS SEG NFR BLD: 55.5 %
NONHDLC SERPL-MCNC: 112 MG/DL
NRBC BLD-RTO: 0 %
PDW BLD AUTO: 10.9 FL (ref 9.4–12.3)
PLATELET # BLD AUTO: 329 K/UL (ref 150–369)
POTASSIUM SERPL-SCNC: 4.5 MEQ/L (ref 3.6–5.1)
PROT SERPL-MCNC: 6.5 G/DL (ref 6–8.2)
RBC # BLD AUTO: 4.67 M/UL (ref 3.93–5.22)
SODIUM SERPL-SCNC: 139 MEQ/L (ref 136–144)
TRIGL SERPL-MCNC: 42 MG/DL (ref 30–149)
WBC # BLD AUTO: 5.88 K/UL (ref 3.8–10.5)

## 2023-05-31 PROCEDURE — 80053 COMPREHEN METABOLIC PANEL: CPT

## 2023-05-31 PROCEDURE — 85025 COMPLETE CBC W/AUTO DIFF WBC: CPT

## 2023-05-31 PROCEDURE — 36415 COLL VENOUS BLD VENIPUNCTURE: CPT

## 2023-05-31 PROCEDURE — 80061 LIPID PANEL: CPT

## 2023-07-19 ENCOUNTER — OFFICE VISIT (OUTPATIENT)
Dept: FAMILY MEDICINE | Facility: CLINIC | Age: 38
End: 2023-07-19
Payer: COMMERCIAL

## 2023-07-19 VITALS
SYSTOLIC BLOOD PRESSURE: 132 MMHG | OXYGEN SATURATION: 99 % | TEMPERATURE: 97.8 F | DIASTOLIC BLOOD PRESSURE: 86 MMHG | RESPIRATION RATE: 18 BRPM | HEART RATE: 110 BPM | WEIGHT: 178.6 LBS | BODY MASS INDEX: 27.97 KG/M2

## 2023-07-19 DIAGNOSIS — F41.1 GENERALIZED ANXIETY DISORDER: Primary | ICD-10-CM

## 2023-07-19 DIAGNOSIS — F43.11 ACUTE POST-TRAUMATIC STRESS DISORDER: ICD-10-CM

## 2023-07-19 PROCEDURE — 99214 OFFICE O/P EST MOD 30 MIN: CPT | Performed by: FAMILY MEDICINE

## 2023-07-19 PROCEDURE — 3008F BODY MASS INDEX DOCD: CPT | Performed by: FAMILY MEDICINE

## 2023-07-19 RX ORDER — LORAZEPAM 1 MG/1
1 TABLET ORAL EVERY 6 HOURS PRN
Qty: 10 TABLET | Refills: 0 | Status: SHIPPED | OUTPATIENT
Start: 2023-07-19 | End: 2023-09-22

## 2023-07-19 RX ORDER — NORETHINDRONE ACETATE AND ETHINYL ESTRADIOL AND FERROUS FUMARATE 1MG-20(21)
KIT ORAL
COMMUNITY
Start: 2023-07-10

## 2023-07-19 ASSESSMENT — ENCOUNTER SYMPTOMS
CHEST TIGHTNESS: 0
DIARRHEA: 0
DECREASED CONCENTRATION: 0
NERVOUS/ANXIOUS: 1
APPETITE CHANGE: 0
HEADACHES: 1
VOMITING: 0
FEVER: 0
FATIGUE: 1
SHORTNESS OF BREATH: 0
NAUSEA: 0
CHILLS: 0
SLEEP DISTURBANCE: 0

## 2023-07-19 NOTE — PROGRESS NOTES
Subjective      Patient ID: Lupe Segal is a 38 y.o. female.  1985      HPI    Pt presents to the office with concerns about not feeling well.  She had a dental procedure 1 week ago and it did not proceed well.  She has Shala-Danlos syndrome and local anesthesia usually does not work so they tried IV sedation which also failed.  She was awake for the entire dental procedure and felt the pain.  She wanted to stop the procedure however, the dentist would not stop.  She was prescribed Flexeril for the muscle spasm in the jaw but she felt off balance, weaker, anxiety, headaches, and felt heavy.  She is concerned that her blood pressure is elevated and will lead to a stroke.    The following have been reviewed and updated as appropriate in this visit:   Allergies  Meds  Problems       Review of Systems   Constitutional: Positive for fatigue. Negative for appetite change, chills and fever.   Respiratory: Negative for chest tightness and shortness of breath.    Cardiovascular: Negative for chest pain.   Gastrointestinal: Negative for diarrhea, nausea and vomiting.   Neurological: Positive for headaches.   Psychiatric/Behavioral: Negative for decreased concentration, self-injury, sleep disturbance and suicidal ideas. The patient is nervous/anxious.        Objective     Vitals:    07/19/23 0922   BP: 132/86   BP Location: Right upper arm   Patient Position: Sitting   Pulse: (!) 110   Resp: 18   Temp: 36.6 °C (97.8 °F)   SpO2: 99%   Weight: 81 kg (178 lb 9.6 oz)     Body mass index is 27.97 kg/m².    Physical Exam  Vitals and nursing note reviewed.   Constitutional:       Appearance: Normal appearance. She is well-developed.   HENT:      Head: Normocephalic and atraumatic.   Cardiovascular:      Rate and Rhythm: Normal rate and regular rhythm.      Heart sounds: Normal heart sounds. No murmur heard.     No friction rub.   Pulmonary:      Effort: Pulmonary effort is normal. No respiratory distress.      Breath  sounds: Normal breath sounds. No wheezing or rales.   Abdominal:      General: Bowel sounds are normal.      Palpations: Abdomen is soft.      Tenderness: There is no abdominal tenderness. There is no guarding.   Musculoskeletal:      Cervical back: Neck supple.   Neurological:      Mental Status: She is alert.   Psychiatric:         Behavior: Behavior normal.      Comments: Anxious and tearful         Assessment/Plan   Diagnoses and all orders for this visit:    Generalized anxiety disorder (Primary)  -     Ambulatory Referal to [facility]  -     LORazepam (ATIVAN) 1 mg tablet; Take 1 tablet (1 mg total) by mouth every 6 (six) hours as needed for anxiety for up to 10 days.    Acute post-traumatic stress disorder  -     Ambulatory Referal to [facility]  -     LORazepam (ATIVAN) 1 mg tablet; Take 1 tablet (1 mg total) by mouth every 6 (six) hours as needed for anxiety for up to 10 days.    pt most likely experiencing DUGLAS and PTSD from recent experience with the dentist. Unfortunately, she has to return to the same office in 3 weeks for the second part of her procedure which makes her sx worse. Provided contact information for an alternative dental office. She called a therapist on Sunday but has not heard back so referred there to [facility]. Prescribed ativan to be taken as needed. fup prn    I spent 30 minutes on this date of service performing the following activities: obtaining history, performing examination, entering orders, documenting, preparing for visit, obtaining / reviewing records and providing counseling and education.

## 2023-07-21 ENCOUNTER — TELEPHONE (OUTPATIENT)
Dept: ADMISSIONS | Facility: HOSPITAL | Age: 38
End: 2023-07-21
Payer: COMMERCIAL

## 2023-07-21 NOTE — TELEPHONE ENCOUNTER
Initial Intake    Lupe Segal, a 38 y.o. female.    Determination  Determination: [facility] for Outpatient  Psychiatry Evaluation  First appointment date offered: 08/01/23                  Initial Intake    Lupe Segal, a 38 y.o. female     [facility] Referral? : Yes  Intake is for which department?: [facility]  Reason for seeking services (in client's own words)?: Pt presents to the office with concerns about not feeling well.  She had a dental procedure 1 week ago and it did not proceed well.  She has Shala-Danlos syndrome and local anesthesia usually does not work so they tried IV sedation which also failed.  She was awake for the entire dental procedure and felt the pain.  She wanted to stop the procedure however, the dentist would not stop.  She was prescribed Flexeril for the muscle spasm in the jaw but she felt off balance, weaker, anxiety, headaches, and felt heavy.  She is concerned that her blood pressure is elevated and will lead to a stroke.    Current Mental Health Symptoms  Current Symptoms: Anxiety    Mental Health Treatment History  Prior Treatment Reported?: No    Medical  Extensive History: None (Ehlersdanlos Symdrome)  Medications: Ativan 1 mg couple day s    Suicide Thoughts/Plans  Have you had suicidal thoughts in the past 72 hours?: No  Have you ever had suicidal thoughts?: No  Have you ever harmed yourself?: No  Do you have easy access to firearms?: No    Violence/Trauma  Do you currently have, or have you ever had, thoughts of harming someone else?: No  Any history of an event that you would consider emotionally/psychologically/physically traumatic?: dental procedure    Employment and Legal  Are you actively employed?: Yes  Do you now or have you in the past had any legal involvement?: No    Pregnancy/Breastfeeding  Are you pregnant?: No    Substance Use Details  Substance Use Includes: None      Eating Disorders  Do you have any problematic food related behaviors?: No

## 2023-09-15 ENCOUNTER — TELEPHONE (OUTPATIENT)
Dept: SURGERY | Facility: CLINIC | Age: 38
End: 2023-09-15
Payer: COMMERCIAL

## 2023-09-15 NOTE — TELEPHONE ENCOUNTER
[facility] Appointment Request   Provider: Hortensia  Appointment Type: New patient  Reason for Visit: abnormal Mammogram done [place] in [place]-2nd opinion referred   Available Day and Time:  or [facility]  Best Contact Number: [phone]    The practice will reach out to schedule your appointment within the next 2 business days.

## 2023-10-09 ENCOUNTER — OFFICE VISIT (OUTPATIENT)
Dept: SURGERY | Facility: CLINIC | Age: 38
End: 2023-10-09
Payer: COMMERCIAL

## 2023-10-09 VITALS
RESPIRATION RATE: 18 BRPM | SYSTOLIC BLOOD PRESSURE: 133 MMHG | HEART RATE: 91 BPM | TEMPERATURE: 97.5 F | WEIGHT: 178 LBS | DIASTOLIC BLOOD PRESSURE: 88 MMHG | OXYGEN SATURATION: 99 % | BODY MASS INDEX: 27.94 KG/M2 | HEIGHT: 67 IN

## 2023-10-09 DIAGNOSIS — R92.333 HETEROGENEOUSLY DENSE TISSUE OF BOTH BREASTS ON MAMMOGRAPHY: Primary | ICD-10-CM

## 2023-10-09 DIAGNOSIS — R92.8 ABNORMAL MAMMOGRAM: ICD-10-CM

## 2023-10-09 DIAGNOSIS — Z80.3 FAMILY HISTORY OF MALIGNANT NEOPLASM OF BREAST: ICD-10-CM

## 2023-10-09 DIAGNOSIS — Q79.60 EHLERS-DANLOS SYNDROME: ICD-10-CM

## 2023-10-09 DIAGNOSIS — Z91.89 AT RISK FOR BREAST CANCER: ICD-10-CM

## 2023-10-09 DIAGNOSIS — F43.11 ACUTE POSTTRAUMATIC STRESS DISORDER: ICD-10-CM

## 2023-10-09 PROCEDURE — 3008F BODY MASS INDEX DOCD: CPT | Performed by: SURGERY

## 2023-10-09 PROCEDURE — 99204 OFFICE O/P NEW MOD 45 MIN: CPT | Performed by: SURGERY

## 2023-10-09 RX ORDER — LORAZEPAM 1 MG/1
1 TABLET ORAL EVERY 8 HOURS PRN
Qty: 3 TABLET | Refills: 0 | Status: SHIPPED | OUTPATIENT
Start: 2023-10-09 | End: 2023-11-06

## 2023-10-09 ASSESSMENT — ENCOUNTER SYMPTOMS
NEUROLOGICAL NEGATIVE: 1
PSYCHIATRIC NEGATIVE: 1
ARTHRALGIAS: 1
CONSTITUTIONAL NEGATIVE: 1
BRUISES/BLEEDS EASILY: 1
EYES NEGATIVE: 1
RESPIRATORY NEGATIVE: 1
ENDOCRINE NEGATIVE: 1
GASTROINTESTINAL NEGATIVE: 1
CARDIOVASCULAR NEGATIVE: 1

## 2023-10-09 ASSESSMENT — PAIN SCALES - GENERAL: PAINLEVEL: 0-NO PAIN

## 2023-10-09 NOTE — LETTER
2023     Og Nina DO  [address]  [address]  [facility] [address]    Patient: Lupe Segal  YOB: 1985  Date of Visit: 10/9/2023      Dear Dr. Nina:    Thank you for referring Lupe Segal to me for evaluation. Below are my notes for this consultation.    If you have questions, please do not hesitate to call me. I look forward to following your patient along with you.         Sincerely,        PAYTON Lomeli        CC: MD Vivian Bazzi Annette M, CRNP  10/9/2023  9:43 AM  Sign when Signing Visit      Breast Surgical Consultation        Patient: Lupe Segal                                : 1985    Date of consultation: 10/9/2023    Referring Provider: Mercedez Bliss MD  PCP: Og Nina DO  GYN:  Mercedez Bliss MD      Chief Complaint: Abnormal mammogram     Lupe Segal is a 38 y.o. old female presenting today for agitation at the recommendation of Dr. Bliss for further evaluation of an abnormal baseline mammogram.  Lupe states that she had a screening mammogram on 2023 through [facility] and was recommended to return for additional imaging of each breast for further evaluation of a mass noted on mammographic imaging.  She returned to the same facility on 2023 for bilateral breast ultrasound.  In the right breast at 9:00, 2 to 3 cm from the nipple is a complex cyst measuring 1.0 x 0.7 x 0.6 cm and in the left breast at 12:00 in the retroareolar region there was no abnormality noted however at 5:00, 6 cm from the nipple was a solid well-circumscribed hypoechoic mass without internal flow measuring 1.0 x 0.4 x 0.8 cm, and may represent a fibroadenoma.  At a minimum, a 6-month follow-up right breast ultrasound was recommended for surveillance of the complex cyst.  She offers no breast complaints and recently started oral contraceptives in .  She reports that her  left clavicle was dislocated during the mammogram secondary to her Shala-Danlos syndrome and not related to technique for mammography.  She is very hesitant to have any further procedures on her breast as she finds it very difficult to be anesthetized and at this point has some characteristics of posttraumatic stress related to a failed dental procedure with local anesthesia in 2023.  She has had no previous breast procedures and has a family history of breast cancer in her only paternal aunt who was 70 at diagnosis.  She is unaware of any genetic testing performed in the family and would welcome a conversation with one of our genetic counselors.  She has heterogeneously dense breast tissue and would like to pursue a breast MRI prior to any breast biopsy given her history of Shala-Danlos syndrome which makes it very difficult for her to be properly anesthetized for procedures.    Breast Imaging: Reviewed in the office.    Breast History: None     OB/GYN Status    Currently Pregnant:  N/A   Last Menstrual Period:  N/A   Menstrual Status:   N/A   LMP Comment:   N/A   Menarche Age:  14   Breastfeeding?  N/A   Lactation Comment:   N/A     OB History        4    Para        Term                AB        Living           SAB        IAB        Ectopic        Multiple        Live Births   2             Medications: has a current medication list which includes the following prescription(s): junel fe 1/20 (28), lorazepam, and multivitamin-ca-iron-minerals.    Family History: family history includes Breast cancer in her father's sister; Heart disease in her biological father; Hypertension in her biological father.  Past Medical History:  has a past medical history of Shala-Danlos syndrome and SVT (supraventricular tachycardia).  Past Surgical History:  has a past surgical history that includes [place] tooth extraction and Knee ligament reconstruction (Right).  Social History:   Social History     Tobacco  "Use    Smoking status: Never    Smokeless tobacco: Never   Substance Use Topics    Alcohol use: Yes     Comment: occasionally    Drug use: Never     Sexual History:  reports being sexually active.  Allergies: is allergic to azithromycin, codeine, and morphine.     Review of Systems   Constitutional: Negative.    HENT: Negative.    Eyes: Negative.    Respiratory: Negative.    Cardiovascular: Negative.    Gastrointestinal: Negative.    Endocrine: Negative.    Genitourinary: Negative.    Musculoskeletal: Positive for arthralgias.        Shala-Danlos   Skin: Negative.    Allergic/Immunologic: Positive for environmental allergies.   Neurological: Negative.    Hematological: Bruises/bleeds easily.   Psychiatric/Behavioral: Negative.      Objective   Vitals:   Visit Vitals  /88 (BP Location: Right upper arm, Patient Position: Sitting)   Pulse 91   Temp 36.4 °C (97.5 °F) (Temporal)   Resp 18   Ht 1.702 m (5' 7\")   Wt 80.7 kg (178 lb)   SpO2 99%   BMI 27.88 kg/m²     Physical Exam  Constitutional:       Appearance: She is well-developed.   HENT:      Head: Normocephalic and atraumatic.   Eyes:      General: No scleral icterus.  Cardiovascular:      Rate and Rhythm: Normal rate and regular rhythm.      Heart sounds: Normal heart sounds.   Pulmonary:      Effort: Pulmonary effort is normal.      Breath sounds: Normal breath sounds.   Abdominal:      Palpations: Abdomen is soft.   Musculoskeletal:         General: Normal range of motion.      Cervical back: Normal range of motion and neck supple.   Skin:     General: Skin is warm and dry.   Neurological:      Mental Status: She is alert and oriented to person, place, and time.   Psychiatric:         Mood and Affect: Mood normal.         Behavior: Behavior normal.         Thought Content: Thought content normal.         Judgment: Judgment normal.     Breast exam: The breasts are symmetrical and dense on exam.  There is no cervical, supraclavicular, or axillary " adenopathy.  There are no dominant masses palpated on today's clinical exam to correlate to sonographic imaging.  There are no overlying skin changes, nipple discharge or nipple retraction.  Exam of left breast was slightly limited as she is only able to abduct her arm to approximately 90 degrees given her recent clavicular dislocation.      Assessment/Plan   Problem List Items Addressed This Visit        Musculoskeletal    Shala-Danlos syndrome    Relevant Orders    MRI BREAST BILATERAL WITH AND WITHOUT CONTRAST    ULTRASOUND BREAST LIMITED RIGHT       Mental Health    Acute posttraumatic stress disorder     She was under the care of a provider for acute posttraumatic stress disorder from her recent dislocated clavicle during breast imaging that was related to her Shala-Danlos syndrome and not use her technique.  She also reports a failed dental procedure due to the inability of the local anesthetic to properly anesthetize the treatment area.  She is very teary and discussing potential breast biopsy and would like to pursue a less invasive evaluation of her breast tissue prior to tissue sampling.            Other    Heterogeneously dense tissue of both breasts on mammography - Primary     She has heterogeneously dense breast tissue which may limit the sensitivity of mammography. She understands a 6-month follow-up right breast ultrasound is recommended for surveillance of a probable complex cyst.  As far as the left breast mass, which may represent a probable fibroadenoma, she would like to proceed with breast MRI for further evaluation prior to invasive procedure.  We will attempt to preauthorize a breast MRI and at a minimum see her back in the office for further evaluation.         Relevant Orders    MRI BREAST BILATERAL WITH AND WITHOUT CONTRAST    ULTRASOUND BREAST LIMITED RIGHT    Abnormal mammogram     There is no palpable density in either breast on clinical exam today.  She is recommended a 6-month  follow-up right breast ultrasound for surveillance of a probable complex cyst in the right breast at 9:00, 2 to 3 cm from the nipple.  There is a well-circumscribed solid mass in the left breast at 5:00, 6 cm from the nipple which may represent a fibroadenoma.  She is hesitant to proceed with breast biopsy given her Shala-Danlos syndrome which makes it difficult for her to be anesthetized.  She has used Septocaine in the past but prefers breast MRI for further evaluation prior to any breast biopsy.  We will have a member of our office assist with the preauthorization scheduling her breast MRI and see Nunu back in the office in 1 month for further evaluation and discussion.         Relevant Orders    MRI BREAST BILATERAL WITH AND WITHOUT CONTRAST    ULTRASOUND BREAST LIMITED RIGHT    At risk for breast cancer     She has an elevated lifetime risk for breast cancer according to the Tyrer-Cuzick model at 18%.  She has a family history of breast cancer and heterogeneously dense breast tissue and would like to proceed with a breast MRI for further evaluation of her breast tissue as her mammogram was inconclusive showing an abnormality in each breast.         Relevant Orders    MRI BREAST BILATERAL WITH AND WITHOUT CONTRAST    ULTRASOUND BREAST LIMITED RIGHT    Family history of malignant neoplasm of breast     She has a family history of breast cancer in a paternal aunt who was 70 at diagnosis.  She does not believe her aunt had any genetic testing and is interested in speaking to one of our genetic counselors.  Message has been sent to our genetics department to reach out to Lupe for further discussion.          We will assist with preauthorization scheduling of a breast MRI.  1 mg of Ativan was ordered for her to use prior to her breast MRI given her claustrophobia, with instructions not to drive while taking this medication.  At a minimum we will see her back in the office in 1 month for further discussion  regarding recommendations for the left breast mass related to the breast MRI findings.  We will also assist with setting her up for the 6-month follow-up right breast ultrasound and office visit to follow.       PAYTON Lomeli

## 2023-10-09 NOTE — Clinical Note
Her Tyrer-Cuzick is 18% she has a family history of breast cancer in a paternal aunt as well as heterogeneously dense breast tissue.  Given her Shala-Danlos, biopsies were difficult as she is unable to adequately get anesthetized.  We would like to proceed with breast MRI first before any biopsy for further evaluation.  She would like this done at [facility] if you could preauthorize this as she will be due for her next.  In the next 3 weeks.

## 2023-10-09 NOTE — PROGRESS NOTES
Breast Surgical Consultation        Patient: Lupe Segal                                : 1985    Date of consultation: 10/9/2023    Referring Provider: Mercedez Bliss MD  PCP: Og Nina DO  GYN:  Mercedez Bliss MD      Chief Complaint: Abnormal mammogram     Lupe Segal is a 38 y.o. old female presenting today for agitation at the recommendation of Dr. Bliss for further evaluation of an abnormal baseline mammogram.  Lupe states that she had a screening mammogram on 2023 through [facility] and was recommended to return for additional imaging of each breast for further evaluation of a mass noted on mammographic imaging.  She returned to the same facility on 2023 for bilateral breast ultrasound.  In the right breast at 9:00, 2 to 3 cm from the nipple is a complex cyst measuring 1.0 x 0.7 x 0.6 cm and in the left breast at 12:00 in the retroareolar region there was no abnormality noted however at 5:00, 6 cm from the nipple was a solid well-circumscribed hypoechoic mass without internal flow measuring 1.0 x 0.4 x 0.8 cm, and may represent a fibroadenoma.  At a minimum, a 6-month follow-up right breast ultrasound was recommended for surveillance of the complex cyst.  She offers no breast complaints and recently started oral contraceptives in .  She reports that her left clavicle was dislocated during the mammogram secondary to her Shala-Danlos syndrome and not related to technique for mammography.  She is very hesitant to have any further procedures on her breast as she finds it very difficult to be anesthetized and at this point has some characteristics of posttraumatic stress related to a failed dental procedure with local anesthesia in 2023.  She has had no previous breast procedures and has a family history of breast cancer in her only paternal aunt who was 70 at diagnosis.  She is unaware of any genetic testing  performed in the family and would welcome a conversation with one of our genetic counselors.  She has heterogeneously dense breast tissue and would like to pursue a breast MRI prior to any breast biopsy given her history of Shala-Danlos syndrome which makes it very difficult for her to be properly anesthetized for procedures.    Breast Imaging: Reviewed in the office.    Breast History: None     OB/GYN Status    Currently Pregnant:  N/A   Last Menstrual Period:  N/A   Menstrual Status:   N/A   LMP Comment:   N/A   Menarche Age:  14   Breastfeeding?  N/A   Lactation Comment:   N/A     OB History        4    Para        Term                AB        Living           SAB        IAB        Ectopic        Multiple        Live Births   2             Medications: has a current medication list which includes the following prescription(s): junel fe  (28), lorazepam, and multivitamin-ca-iron-minerals.    Family History: family history includes Breast cancer in her father's sister; Heart disease in her biological father; Hypertension in her biological father.  Past Medical History:  has a past medical history of Shala-Danlos syndrome and SVT (supraventricular tachycardia).  Past Surgical History:  has a past surgical history that includes [place] tooth extraction and Knee ligament reconstruction (Right).  Social History:   Social History     Tobacco Use    Smoking status: Never    Smokeless tobacco: Never   Substance Use Topics    Alcohol use: Yes     Comment: occasionally    Drug use: Never     Sexual History:  reports being sexually active.  Allergies: is allergic to azithromycin, codeine, and morphine.     Review of Systems   Constitutional: Negative.    HENT: Negative.    Eyes: Negative.    Respiratory: Negative.    Cardiovascular: Negative.    Gastrointestinal: Negative.    Endocrine: Negative.    Genitourinary: Negative.    Musculoskeletal: Positive for arthralgias.        Shala-Danlos   Skin:  "Negative.    Allergic/Immunologic: Positive for environmental allergies.   Neurological: Negative.    Hematological: Bruises/bleeds easily.   Psychiatric/Behavioral: Negative.      Objective   Vitals:   Visit Vitals  /88 (BP Location: Right upper arm, Patient Position: Sitting)   Pulse 91   Temp 36.4 °C (97.5 °F) (Temporal)   Resp 18   Ht 1.702 m (5' 7\")   Wt 80.7 kg (178 lb)   SpO2 99%   BMI 27.88 kg/m²     Physical Exam  Constitutional:       Appearance: She is well-developed.   HENT:      Head: Normocephalic and atraumatic.   Eyes:      General: No scleral icterus.  Cardiovascular:      Rate and Rhythm: Normal rate and regular rhythm.      Heart sounds: Normal heart sounds.   Pulmonary:      Effort: Pulmonary effort is normal.      Breath sounds: Normal breath sounds.   Abdominal:      Palpations: Abdomen is soft.   Musculoskeletal:         General: Normal range of motion.      Cervical back: Normal range of motion and neck supple.   Skin:     General: Skin is warm and dry.   Neurological:      Mental Status: She is alert and oriented to person, place, and time.   Psychiatric:         Mood and Affect: Mood normal.         Behavior: Behavior normal.         Thought Content: Thought content normal.         Judgment: Judgment normal.     Breast exam: The breasts are symmetrical and dense on exam.  There is no cervical, supraclavicular, or axillary adenopathy.  There are no dominant masses palpated on today's clinical exam to correlate to sonographic imaging.  There are no overlying skin changes, nipple discharge or nipple retraction.  Exam of left breast was slightly limited as she is only able to abduct her arm to approximately 90 degrees given her recent clavicular dislocation.       Assessment/Plan   Problem List Items Addressed This Visit        Musculoskeletal    Shala-Danlos syndrome    Relevant Orders    MRI BREAST BILATERAL WITH AND WITHOUT CONTRAST    ULTRASOUND BREAST LIMITED RIGHT       Mental " Health    Acute posttraumatic stress disorder     She was under the care of a provider for acute posttraumatic stress disorder from her recent dislocated clavicle during breast imaging that was related to her Shala-Danlos syndrome and not use her technique.  She also reports a failed dental procedure due to the inability of the local anesthetic to properly anesthetize the treatment area.  She is very teary and discussing potential breast biopsy and would like to pursue a less invasive evaluation of her breast tissue prior to tissue sampling.            Other    Heterogeneously dense tissue of both breasts on mammography - Primary     She has heterogeneously dense breast tissue which may limit the sensitivity of mammography. She understands a 6-month follow-up right breast ultrasound is recommended for surveillance of a probable complex cyst.  As far as the left breast mass, which may represent a probable fibroadenoma, she would like to proceed with breast MRI for further evaluation prior to invasive procedure.  We will attempt to preauthorize a breast MRI and at a minimum see her back in the office for further evaluation.         Relevant Orders    MRI BREAST BILATERAL WITH AND WITHOUT CONTRAST    ULTRASOUND BREAST LIMITED RIGHT    Abnormal mammogram     There is no palpable density in either breast on clinical exam today.  She is recommended a 6-month follow-up right breast ultrasound for surveillance of a probable complex cyst in the right breast at 9:00, 2 to 3 cm from the nipple.  There is a well-circumscribed solid mass in the left breast at 5:00, 6 cm from the nipple which may represent a fibroadenoma.  She is hesitant to proceed with breast biopsy given her Shala-Danlos syndrome which makes it difficult for her to be anesthetized.  She has used Septocaine in the past but prefers breast MRI for further evaluation prior to any breast biopsy.  We will have a member of our office assist with the  preauthorization scheduling her breast MRI and see Nunu back in the office in 1 month for further evaluation and discussion.         Relevant Orders    MRI BREAST BILATERAL WITH AND WITHOUT CONTRAST    ULTRASOUND BREAST LIMITED RIGHT    At risk for breast cancer     She has an elevated lifetime risk for breast cancer according to the Tyrer-Cuzick model at 18%.  She has a family history of breast cancer and heterogeneously dense breast tissue and would like to proceed with a breast MRI for further evaluation of her breast tissue as her mammogram was inconclusive showing an abnormality in each breast.         Relevant Orders    MRI BREAST BILATERAL WITH AND WITHOUT CONTRAST    ULTRASOUND BREAST LIMITED RIGHT    Family history of malignant neoplasm of breast     She has a family history of breast cancer in a paternal aunt who was 70 at diagnosis.  She does not believe her aunt had any genetic testing and is interested in speaking to one of our genetic counselors.  Message has been sent to our genetics department to reach out to Lupe for further discussion.          We will assist with preauthorization scheduling of a breast MRI.  1 mg of Ativan was ordered for her to use prior to her breast MRI given her claustrophobia, with instructions not to drive while taking this medication.  At a minimum we will see her back in the office in 1 month for further discussion regarding recommendations for the left breast mass related to the breast MRI findings.  We will also assist with setting her up for the 6-month follow-up right breast ultrasound and office visit to follow.       PAYTON Lomeli

## 2023-10-09 NOTE — ASSESSMENT & PLAN NOTE
She has heterogeneously dense breast tissue which may limit the sensitivity of mammography. She understands a 6-month follow-up right breast ultrasound is recommended for surveillance of a probable complex cyst.  As far as the left breast mass, which may represent a probable fibroadenoma, she would like to proceed with breast MRI for further evaluation prior to invasive procedure.  We will attempt to preauthorize a breast MRI and at a minimum see her back in the office for further evaluation.

## 2023-10-09 NOTE — Clinical Note
She has a family history of breast cancer in her paternal aunt who was postmenopausal at diagnosis.  Neither her mother or maternal aunt had any breast cancer.  She is interested in speaking with someone in your department regarding genetic testing and if she is a candidate for insurance coverage.  Can you please reach out to her?  Thank you

## 2023-10-09 NOTE — PATIENT INSTRUCTIONS
Andie will call you if she can preauthorize your breast MRI.  If she is able to get a preauthorization from your insurance company you would then call central scheduling at [phone] on the first day of your menstrual cycle and they will assist you in scheduling the MRI on day 7-14 after your cycle begins.  If she is unable to get a preauthorization and the peer to peer appeal is not successful we can schedule the abbreviated breast MRI.

## 2023-10-09 NOTE — ASSESSMENT & PLAN NOTE
She has an elevated lifetime risk for breast cancer according to the Tyrer-Cuzick model at 18%.  She has a family history of breast cancer and heterogeneously dense breast tissue and would like to proceed with a breast MRI for further evaluation of her breast tissue as her mammogram was inconclusive showing an abnormality in each breast.

## 2023-10-09 NOTE — ASSESSMENT & PLAN NOTE
She was under the care of a provider for acute posttraumatic stress disorder from her recent dislocated clavicle during breast imaging that was related to her Shala-Danlos syndrome and not use her technique.  She also reports a failed dental procedure due to the inability of the local anesthetic to properly anesthetize the treatment area.  She is very teary and discussing potential breast biopsy and would like to pursue a less invasive evaluation of her breast tissue prior to tissue sampling.

## 2023-10-09 NOTE — ASSESSMENT & PLAN NOTE
There is no palpable density in either breast on clinical exam today.  She is recommended a 6-month follow-up right breast ultrasound for surveillance of a probable complex cyst in the right breast at 9:00, 2 to 3 cm from the nipple.  There is a well-circumscribed solid mass in the left breast at 5:00, 6 cm from the nipple which may represent a fibroadenoma.  She is hesitant to proceed with breast biopsy given her Shala-Danlos syndrome which makes it difficult for her to be anesthetized.  She has used Septocaine in the past but prefers breast MRI for further evaluation prior to any breast biopsy.  We will have a member of our office assist with the preauthorization scheduling her breast MRI and see Nunu back in the office in 1 month for further evaluation and discussion.

## 2023-10-09 NOTE — ASSESSMENT & PLAN NOTE
She has a family history of breast cancer in a paternal aunt who was 70 at diagnosis.  She does not believe her aunt had any genetic testing and is interested in speaking to one of our genetic counselors.  Message has been sent to our genetics department to reach out to Lupe for further discussion.

## 2023-10-10 ENCOUNTER — TELEPHONE (OUTPATIENT)
Dept: GENETICS | Facility: HOSPITAL | Age: 38
End: 2023-10-10
Payer: COMMERCIAL

## 2023-10-10 NOTE — TELEPHONE ENCOUNTER
Lupe Segal called back to get scheduled. I scheduled her for a telemedicine appointment on 11/28 at 4 pm.       I left a message for Lupe Segal regarding scheduling a cancer genetic counseling appointment per referral from PAYTON Christianson due to family history. I provided information about the program as well as the office and general scheduling line.     11/27 I left a message for patient to remind them of their genetic counseling appointment tomorrow. I provided directions and gave the number to my direct line.         Partial Purse String (Intermediate) Text: Given the location of the defect and the characteristics of the surrounding skin an intermediate purse string closure was deemed most appropriate.  Undermining was performed circumfirentially around the surgical defect.  A purse string suture was then placed and tightened. Wound tension only allowed a partial closure of the circular defect.

## 2023-10-17 ENCOUNTER — TELEPHONE (OUTPATIENT)
Dept: SURGERY | Facility: CLINIC | Age: 38
End: 2023-10-17

## 2023-10-17 NOTE — TELEPHONE ENCOUNTER
Spoke to pt she has been auth#[id number] for breast mri at [facility]  Valid 10-17/12-15-23  Number given to [facility] appt

## 2023-11-03 ENCOUNTER — TELEPHONE (OUTPATIENT)
Dept: SURGERY | Facility: CLINIC | Age: 38
End: 2023-11-03
Payer: COMMERCIAL

## 2023-11-03 NOTE — TELEPHONE ENCOUNTER
Pt is stating she is getting her mri soon and would like you to order her medication for claustrophobic

## 2023-11-06 ENCOUNTER — TELEPHONE (OUTPATIENT)
Dept: SURGERY | Facility: CLINIC | Age: 38
End: 2023-11-06
Payer: COMMERCIAL

## 2023-11-06 RX ORDER — LORAZEPAM 1 MG/1
1 TABLET ORAL EVERY 8 HOURS PRN
Qty: 3 TABLET | Refills: 0 | Status: SHIPPED | OUTPATIENT
Start: 2023-11-06 | End: 2024-10-29

## 2023-11-06 NOTE — PROGRESS NOTES
New order placed for Ativan as previous order was not filled as pharmacy did not receive prescription.  Lupe is aware and will  her Ativan for her upcoming breast MRI tomorrow.

## 2023-11-07 ENCOUNTER — HOSPITAL ENCOUNTER (OUTPATIENT)
Dept: RADIOLOGY | Facility: HOSPITAL | Age: 38
Discharge: HOME | End: 2023-11-07
Attending: SURGERY
Payer: COMMERCIAL

## 2023-11-07 DIAGNOSIS — R92.8 ABNORMAL MAMMOGRAM: ICD-10-CM

## 2023-11-07 DIAGNOSIS — R92.333 HETEROGENEOUSLY DENSE TISSUE OF BOTH BREASTS ON MAMMOGRAPHY: ICD-10-CM

## 2023-11-07 DIAGNOSIS — Q79.60 EHLERS-DANLOS SYNDROME: ICD-10-CM

## 2023-11-07 DIAGNOSIS — Z91.89 AT RISK FOR BREAST CANCER: ICD-10-CM

## 2023-11-07 RX ORDER — GADOBUTROL 604.72 MG/ML
7.9 INJECTION INTRAVENOUS ONCE
Status: COMPLETED | OUTPATIENT
Start: 2023-11-07 | End: 2023-11-07

## 2023-11-07 RX ADMIN — GADOBUTROL 7.9 ML: 604.72 INJECTION INTRAVENOUS at 08:18

## 2023-11-08 ENCOUNTER — OFFICE VISIT (OUTPATIENT)
Dept: SURGERY | Facility: CLINIC | Age: 38
End: 2023-11-08
Payer: COMMERCIAL

## 2023-11-08 VITALS
WEIGHT: 175 LBS | HEART RATE: 110 BPM | RESPIRATION RATE: 18 BRPM | SYSTOLIC BLOOD PRESSURE: 122 MMHG | DIASTOLIC BLOOD PRESSURE: 74 MMHG | TEMPERATURE: 97.5 F | HEIGHT: 67 IN | BODY MASS INDEX: 27.47 KG/M2 | OXYGEN SATURATION: 99 %

## 2023-11-08 DIAGNOSIS — R92.8 ABNORMAL ULTRASOUND OF BREAST: ICD-10-CM

## 2023-11-08 DIAGNOSIS — R92.333 HETEROGENEOUSLY DENSE TISSUE OF BOTH BREASTS ON MAMMOGRAPHY: Primary | ICD-10-CM

## 2023-11-08 PROCEDURE — 99214 OFFICE O/P EST MOD 30 MIN: CPT | Performed by: SURGERY

## 2023-11-08 PROCEDURE — 3008F BODY MASS INDEX DOCD: CPT | Performed by: SURGERY

## 2023-11-08 ASSESSMENT — PAIN SCALES - GENERAL: PAINLEVEL: 0-NO PAIN

## 2023-11-08 NOTE — LETTER
November 8, 2023     Og Nina,   [address]  [place] [address]    Patient: Lupe Segal  YOB: 1985  Date of Visit: 11/8/2023      Dear Dr. Nina:    Thank you for referring Lupe Segal to me for evaluation. Below are my notes for this consultation.    If you have questions, please do not hesitate to call me. I look forward to following your patient along with you.         Sincerely,        PAYTON Lomeli        CC: MD Vivian Bazzi, PAYTON [place]  11/8/2023  1:35 PM  Sign when Signing Visit  Lupe returns to the office today for follow-up of her abnormal breast imaging given her level of anxiety.  She is undergoing counseling sessions for PTS related to a dental procedure that she reports she was not properly anesthetized for as well as a broken clavicle post mammogram at an outside facility.  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  Mammographic imaging on September 14, 2023 showed bilateral breast abnormalities requiring follow-up ultrasound.  On September 26, 2023 ultrasound of the right breast at 9:00, 2 to 3 cm from the nipple shows a complex cyst measuring 1.0 x 0.7 x 0.6 cm with no internal flow and a 6-month follow-up right breast ultrasound is recommended for surveillance.  Ultrasound imaging of the left breast at 5:00, 6 cm from the nipple shows a solid well-circumscribed hypoechoic mass measuring 1.0 x 0.4 x 0.8 cm with no internal blood flow.  This corresponds to the mass seen on mammography and most likely represents a fibroadenoma.  Surgical consultation at that point had been recommended for evaluation of the probable fibroadenoma in the left breast.  Some of the ultrasound images were not able to be evaluated in the PACS system due to discoloration and overlay.  She is  very hesitant to have any type of needle biopsy unless under sedation given her difficulty with anesthetizing the tissue.  A breast MRI was performed on November 7, 2023 and although there was moderate background enhancement, no focal abnormal enhancement was identified in the left breast to correspond to the mammographic/sonographic imaging.  No abnormalities were noted in the right breast.  She has heterogeneously dense breast tissue.    There are no changes in her past medical history, past surgical history, medications or allergies.    There are no changes in her family history.    ROS:   Significant for extremely heightened anxiety related to potential for breast procedure, breast imaging and any other medical procedure.    Physical exam: Well-developed, well-nourished female in no apparent distress.  She is alert and oriented ×3 and her mood and affect are appropriate.  Her HEENT has no cervical or supraclavicular adenopathy.  There are no thyroid masses.  Her breast examination is symmetric.  There are no dominant masses, skin changes, nipple discharge or axillary adenopathy.    Bilateral breast ultrasound was performed in the office today for evaluation and not diagnostic purposes.  The complex cyst in the right breast was reproduced at 9:00, 2 cm from the nipple measuring 1.1 x 0.5 x 0.6 cm.  The deviation and measurement can be attributed to technical differences.  Ultrasound of the left breast at 5:00, 6 cm from the nipple shows a rounded, more solid lesion measuring 1.0 x 0.6 x 0.6 cm, attributing for technical differences.    Assessment/Plan :  Problem List Items Addressed This Visit        Other    Heterogeneously dense tissue of both breasts on mammography - Primary     She has heterogeneously dense breast tissue.  Previous imaging showed a complex cyst in the right breast for which a 6-month follow-up breast ultrasound is recommended.  In the left breast at 5:00 6 cm from the nipple is a rounded  solid lesion with no significant change in size from previous ultrasound in September.  Given that some of the images were not viewed due to discoloration overlay on transferring of images, a breast MRI was performed on November 7, 2023.  The MRI did not demonstrate any further abnormalities in either breast.  She will follow back up in our office on December 13 for surgical consultation.         Relevant Orders    ULTRASOUND BREAST LIMITED BILATERAL    Abnormal ultrasound of breast     She is recommended to have a 6-month follow-up right breast ultrasound for surveillance of a complex cyst at 9:00, 2 cm from the nipple.  The area was reproduced today on ultrasound in the office with no change in dimension.  She will have a surgical consultation with Dr. Bri Bazan on December 13, 2023 for evaluation of a probable fibroadenoma.  Given Lupe's extreme level of anxiety related to any type of medical procedure, she favors surveillance of possible versus any type of biopsy intervention.  Repeat breast ultrasound has been scheduled on December 13, 2023 with follow-up with Dr. Bazan.         Relevant Orders    ULTRASOUND BREAST LIMITED BILATERAL   Given her extremely high level of anxiety regarding medical procedures at this point, a 3-month follow-up breast ultrasound is set up for December 13, 2023 and surgical consultation with Dr. Bri Bazan for evaluation and management of a probable fibroadenoma in the left breast.    Return in about 4 weeks (around 12/6/2023).    PAYTON Lomeli

## 2023-11-08 NOTE — ASSESSMENT & PLAN NOTE
She has heterogeneously dense breast tissue.  Previous imaging showed a complex cyst in the right breast for which a 6-month follow-up breast ultrasound is recommended.  In the left breast at 5:00 6 cm from the nipple is a rounded solid lesion with no significant change in size from previous ultrasound in September.  Given that some of the images were not viewed due to discoloration overlay on transferring of images, a breast MRI was performed on November 7, 2023.  The MRI did not demonstrate any further abnormalities in either breast.  She will follow back up in our office on December 13 for surgical consultation.

## 2023-11-08 NOTE — PROGRESS NOTES
Lupe returns to the office today for follow-up of her abnormal breast imaging given her level of anxiety.  She is undergoing counseling sessions for PTS related to a dental procedure that she reports she was not properly anesthetized for as well as a broken clavicle post mammogram at an outside facility.  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  Mammographic imaging on September 14, 2023 showed bilateral breast abnormalities requiring follow-up ultrasound.  On September 26, 2023 ultrasound of the right breast at 9:00, 2 to 3 cm from the nipple shows a complex cyst measuring 1.0 x 0.7 x 0.6 cm with no internal flow and a 6-month follow-up right breast ultrasound is recommended for surveillance.  Ultrasound imaging of the left breast at 5:00, 6 cm from the nipple shows a solid well-circumscribed hypoechoic mass measuring 1.0 x 0.4 x 0.8 cm with no internal blood flow.  This corresponds to the mass seen on mammography and most likely represents a fibroadenoma.  Surgical consultation at that point had been recommended for evaluation of the probable fibroadenoma in the left breast.  Some of the ultrasound images were not able to be evaluated in the PACS system due to discoloration and overlay.  She is very hesitant to have any type of needle biopsy unless under sedation given her difficulty with anesthetizing the tissue.  A breast MRI was performed on November 7, 2023 and although there was moderate background enhancement, no focal abnormal enhancement was identified in the left breast to correspond to the mammographic/sonographic imaging.  No abnormalities were noted in the right breast.  She has heterogeneously dense breast tissue.    There are no changes in her past medical history, past surgical history, medications or allergies.    There are no changes in  her family history.    ROS:   Significant for extremely heightened anxiety related to potential for breast procedure, breast imaging and any other medical procedure.    Physical exam: Well-developed, well-nourished female in no apparent distress.  She is alert and oriented ×3 and her mood and affect are appropriate.  Her HEENT has no cervical or supraclavicular adenopathy.  There are no thyroid masses.  Her breast examination is symmetric.  There are no dominant masses, skin changes, nipple discharge or axillary adenopathy.    Bilateral breast ultrasound was performed in the office today for evaluation and not diagnostic purposes.  The complex cyst in the right breast was reproduced at 9:00, 2 cm from the nipple measuring 1.1 x 0.5 x 0.6 cm.  The deviation and measurement can be attributed to technical differences.  Ultrasound of the left breast at 5:00, 6 cm from the nipple shows a rounded, more solid lesion measuring 1.0 x 0.6 x 0.6 cm, attributing for technical differences.    Assessment/Plan :  Problem List Items Addressed This Visit        Other    Heterogeneously dense tissue of both breasts on mammography - Primary     She has heterogeneously dense breast tissue.  Previous imaging showed a complex cyst in the right breast for which a 6-month follow-up breast ultrasound is recommended.  In the left breast at 5:00 6 cm from the nipple is a rounded solid lesion with no significant change in size from previous ultrasound in September.  Given that some of the images were not viewed due to discoloration overlay on transferring of images, a breast MRI was performed on November 7, 2023.  The MRI did not demonstrate any further abnormalities in either breast.  She will follow back up in our office on December 13 for surgical consultation.         Relevant Orders    ULTRASOUND BREAST LIMITED BILATERAL    Abnormal ultrasound of breast     She is recommended to have a 6-month follow-up right breast ultrasound for  surveillance of a complex cyst at 9:00, 2 cm from the nipple.  The area was reproduced today on ultrasound in the office with no change in dimension.  She will have a surgical consultation with Dr. Bri Bazan on December 13, 2023 for evaluation of a probable fibroadenoma.  Given Lupe's extreme level of anxiety related to any type of medical procedure, she favors surveillance of possible versus any type of biopsy intervention.  Repeat breast ultrasound has been scheduled on December 13, 2023 with follow-up with Dr. Bazan.         Relevant Orders    ULTRASOUND BREAST LIMITED BILATERAL   Given her extremely high level of anxiety regarding medical procedures at this point, a 3-month follow-up breast ultrasound is set up for December 13, 2023 and surgical consultation with Dr. Bri Bazan for evaluation and management of a probable fibroadenoma in the left breast.    Return in about 4 weeks (around 12/6/2023).    PAYTON Lomeli

## 2023-11-08 NOTE — ASSESSMENT & PLAN NOTE
She is recommended to have a 6-month follow-up right breast ultrasound for surveillance of a complex cyst at 9:00, 2 cm from the nipple.  The area was reproduced today on ultrasound in the office with no change in dimension.  She will have a surgical consultation with Dr. Bri Bazan on December 13, 2023 for evaluation of a probable fibroadenoma.  Given Lupe's extreme level of anxiety related to any type of medical procedure, she favors surveillance of possible versus any type of biopsy intervention.  Repeat breast ultrasound has been scheduled on December 13, 2023 with follow-up with Dr. Bazan.

## 2023-11-28 ENCOUNTER — TELEMEDICINE (OUTPATIENT)
Dept: GENETICS | Facility: HOSPITAL | Age: 38
End: 2023-11-28
Attending: INTERNAL MEDICINE
Payer: COMMERCIAL

## 2023-11-28 DIAGNOSIS — Z80.3 FHX: BREAST CANCER: ICD-10-CM

## 2023-11-28 DIAGNOSIS — Z71.83 ENCOUNTER FOR NONPROCREATIVE GENETIC COUNSELING: Primary | ICD-10-CM

## 2023-11-28 DIAGNOSIS — Z80.8 FHX: MELANOMA: ICD-10-CM

## 2023-11-28 DIAGNOSIS — Z80.8 FHX: BRAIN CANCER: ICD-10-CM

## 2023-11-28 DIAGNOSIS — Z80.7: ICD-10-CM

## 2023-11-28 PROCEDURE — 96040 HC GENETICS COUNSELING SESSIONS-TELEHEALTH: CPT | Mod: GT

## 2023-11-28 NOTE — Clinical Note
11/28/23    Lupe Segal  [address] [place] [id number]    Dear Ms. Segal,    Thank you for participating in the [facility].  It was a pleasure working with you. Attached is documentation from our discussion(s). It will also be sent to any physicians you indicated.      You may also choose to share this documentation with your family members. Because cancer risk is based on both personal and both maternal and paternal family history factors, as well as mutation status, your relatives are recommended to review their risks and genetic testing options (if applicable) with their own healthcare providers to derive a risk-appropriate, individualized plan.      If you have any questions, concerns, or updates to your personal/family history, please contact the [facility] at [phone] to further review your case.    Please see below for your encounter report.    Sincerely,     MAYITO Eller LCGC        Encounter Note    Indication for Appointment:  Lupe Segal presented for genetic counseling and risk assessment via a telemedicine encounter due to a family history of breast cancer. Lupe was referred by PAYTON Christianson and presented to the session alone.    Personal History:   Lupe is 38 y.o. female of [place] and [place] descent with primary visit diagnosis of Encounter for nonprocreative genetic counseling [Z71.83].    Lupe reports she underwent baseline mammogram on 09/14/2023 which showed bilateral breast abnormalities requiring follow-up ultrasond. Bilateral ultrasound revealed a complex cyst in the right breast and solid well-circumscribed hypoechoic mass in the left breast which corresponds to the mass seen on mammography. Biopsy was recommended, however Lupe reports an intolerance to local anesthetics, therefore she opted to pursue breast MRI prior to biopsy. Breast MRI  "was performed on 11/7/2023 which showed no abnormalities in the right breast and no focal abnormal enhancement identified in the left breast to correspond to mammographic/sonographic imaging. Lupe wishes to pursue genetic testing to aid in further decision making regarding biopsy.     Past Medical History:   Diagnosis Date    Disease of thyroid gland     U/S revealed nodule, benign and no f/u recommended per patient report    Shala-Danlos syndrome     hypermobile EDS    Screening for breast cancer     monthly self breast exams, annual clinical breast exams, baseline mammogram at 37 yo (9/2023), breast MRI (11/2023) f/u from abn baseline mammo per patient report    SVT (supraventricular tachycardia)       Past Medical History Pertinent Negatives:   Denies History Of: Date Noted    Fibrocystic breast 11/28/2023    Fibroid 11/28/2023    Ovarian cyst 11/28/2023    Screening for colon cancer 11/28/2023     Past Surgical History:   Procedure Laterality Date    Knee ligament reconstruction Right     [place] tooth extraction       Past Surgical History Pertinent Negatives:   Denies History Of: Date Noted    Breast biopsy 11/28/2023    Colonoscopy 11/28/2023        Height/Weight (previously recorded at physician's office):  Height: 1.702 m (5' 7\")  Weight: 79.4 kg (175 lb)    Gynecologic History:  Menarche Age: 13 years  Age at first live birth: 26  Menopause Status: Pre-Menopause  Use of hormonal contraceptives: Yes (July 2023-current)  Use of fertility medications: No  Use of hormone replacement therapy: No  Use of Tamoxifen/Evista: No    Social History     Tobacco Use    Smoking status: Never    Smokeless tobacco: Never   Substance Use Topics    Alcohol use: Yes     Comment: occasionally    Drug use: Never       Family History:  See completed family history in pedigree below.    Genetic Education/Risk Assessment/Counseling:  Information was provided about the relationship between genes and disease.  The " concept of heredity was defined.  Natural history, risks and inheritance patterns were reviewed, as related to Lupe personal and/or family history.  Related psychosocial aspects were discussed.    Discussion of Genetic Testing:  The pros, cons, and limitations of testing for genetic susceptibility were discussed, including but not limited to test options, possible results, potential impact on management, and psychosocial aspects.  There may be limited data on the degree of risk and/or no defined management guidelines associated with some genes.  If applicable, risk assessment models and/or published tables were used to provide a mutation probability estimate. Limitations of assessment were reviewed.    Given the reported personal and/or family history, Lupe wished to pursue genetic testing. The following testing was ordered:    [facility]  Multi-Cancer Panel + RNA Analysis.   *Of note, Lupe will present to [facility] to provide a blood sample on 12/13/2023 at 8:30AM     Plan:  Lupe was confirmed to have understood the aforementioned information and was assisted with decision making as needed.  Informational and supportive resources were provided. Consent was obtained to share chart note(s) with physicians. Lupe is encouraged to contact the program with personal/family history updates. Lupe will be contacted via telephone when genetic test results are available. Guidelines for health management will be reviewed at that time as appropriate.        A total of 30 minutes was spent providing genetic counseling to Lupe.

## 2023-11-28 NOTE — PROGRESS NOTES
Patient Name: Lupe Segal  Patient Legal Name: Lupe Segal  : 1985     Indication for Appointment:  Lupe Segal presented for genetic counseling and risk assessment via a telemedicine encounter due to a family history of breast cancer. Lupe was referred by PAYTON Christianson and presented to the session alone.    Personal History:   Lupe is 38 y.o. female of [place] and [place] descent with primary visit diagnosis of Encounter for nonprocreative genetic counseling [Z71.83].    Lupe reports she underwent baseline mammogram on 2023 which showed bilateral breast abnormalities requiring follow-up ultrasond. Bilateral ultrasound revealed a complex cyst in the right breast and solid well-circumscribed hypoechoic mass in the left breast which corresponds to the mass seen on mammography. Biopsy was recommended, however Lupe opted to pursue breast MRI prior to biopsy due to local anesthetics intolerance. Breast MRI was performed on 2023 which showed no abnormalities in the right breast and no focal abnormal enhancement identified in the left breast to correspond to mammographic/sonographic imaging. Lupe wishes to pursue genetic testing to aid in further decision making regarding biopsy.     Past Medical History:   Diagnosis Date    Disease of thyroid gland     U/S revealed nodule, benign and no f/u recommended per patient report    Shala-Danlos syndrome     hypermobile EDS    Screening for breast cancer     monthly self breast exams, annual clinical breast exams, baseline mammogram at 37 yo (2023), breast MRI (2023) f/u from abn baseline mammo per patient report    SVT (supraventricular tachycardia)       Past Medical History Pertinent Negatives:   Denies History Of: Date Noted    Fibrocystic breast 2023    Fibroid 2023    Ovarian cyst 2023    Screening for colon cancer 2023     Past Surgical History:   Procedure Laterality Date    Knee  "ligament reconstruction Right     [place] tooth extraction       Past Surgical History Pertinent Negatives:   Denies History Of: Date Noted    Breast biopsy 11/28/2023    Colonoscopy 11/28/2023        Height/Weight (previously recorded at physician's office):  Height: 1.702 m (5' 7\")  Weight: 79.4 kg (175 lb)    Gynecologic History:  Menarche Age: 13 years  Age at first live birth: 26  Menopause Status: Pre-Menopause  Use of hormonal contraceptives: Yes (July 2023-current)  Use of fertility medications: No  Use of hormone replacement therapy: No  Use of Tamoxifen/Evista: No    Social History     Tobacco Use    Smoking status: Never    Smokeless tobacco: Never   Substance Use Topics    Alcohol use: Yes     Comment: occasionally    Drug use: Never       Family History:  See completed family history in pedigree below.    Genetic Education/Risk Assessment/Counseling:  Information was provided about the relationship between genes and disease.  The concept of heredity was defined.  Natural history, risks and inheritance patterns were reviewed, as related to Lupe personal and/or family history.  Related psychosocial aspects were discussed.    Discussion of Genetic Testing:  The pros, cons, and limitations of testing for genetic susceptibility were discussed, including but not limited to test options, possible results, potential impact on management, and psychosocial aspects.  There may be limited data on the degree of risk and/or no defined management guidelines associated with some genes.  If applicable, risk assessment models and/or published tables were used to provide a mutation probability estimate. Limitations of assessment were reviewed.    Given the reported personal and/or family history, Lupe wished to pursue genetic testing. The following testing was ordered:    [facility]  Multi-Cancer Panel + RNA Analysis.   *Of note, Lupe will present to [facility] to provide a blood sample on " 12/13/2023 at 8:30AM     Plan:  Lupe was confirmed to have understood the aforementioned information and was assisted with decision making as needed.  Informational and supportive resources were provided. Consent was obtained to share chart note(s) with physicians. Lupe is encouraged to contact the program with personal/family history updates. Lupe will be contacted via telephone when genetic test results are available. Guidelines for health management will be reviewed at that time as appropriate.        A total of 30 minutes was spent providing genetic counseling to Lupe.

## 2023-11-28 NOTE — LETTER
11/28/23    Bri Bazan MD  [address]  [address]  [place] [address]    Re:  Patient Preferred Name: Lupe Segal  Patient Legal Name: Lupe Segal    Dear Dr. Bazan,    Thank you for referring your patient, Lupe Segal, to receive care through my office. I have enclosed a summary of the care provided to Lupe on 11/28/23.    Please contact me with any questions you may have regarding the visit.    Sincerely,     MAYITO Eller LCGC    [address] [place] [address]  [phone]    CC: No Recipients

## 2023-11-28 NOTE — LETTER
11/28/23    Lupe Segal  [address] [facility] [facility] [address]    Dear Ms. Segal,    Thank you for participating in the [facility].  It was a pleasure working with you. Attached is documentation from our discussion(s). It will also be sent to any physicians you indicated.      You may also choose to share this documentation with your family members. Because cancer risk is based on both personal and both maternal and paternal family history factors, as well as mutation status, your relatives are recommended to review their risks and genetic testing options (if applicable) with their own healthcare providers to derive a risk-appropriate, individualized plan.      If you have any questions, concerns, or updates to your personal/family history, please contact the [facility] at [phone] to further review your case.    Please see below for your encounter report.    Sincerely,     Juanita Salinas AHSAN                          Encounter Note    Indication for Appointment:  Lupe Segal presented for genetic counseling and risk assessment via a telemedicine encounter due to a family history of breast cancer. Lupe was referred by PAYTON Christianson and presented to the session alone.    Personal History:   Lupe is 38 y.o. female of [place] and [place] descent with primary visit diagnosis of Encounter for nonprocreative genetic counseling [Z71.83].    Lupe reports she underwent baseline mammogram on 09/14/2023 which showed bilateral breast abnormalities requiring follow-up ultrasond. Bilateral ultrasound revealed a complex cyst in the right breast and solid well-circumscribed hypoechoic mass in the left breast which corresponds to the mass seen on mammography. Biopsy was recommended, however Lupe opted to pursue breast MRI prior to biopsy due to local anesthetics intolerance. Breast MRI was performed on  "11/7/2023 which showed no abnormalities in the right breast and no focal abnormal enhancement identified in the left breast to correspond to mammographic/sonographic imaging. Lupe wishes to pursue genetic testing to aid in further decision making regarding biopsy.     Past Medical History:   Diagnosis Date    Disease of thyroid gland     U/S revealed nodule, benign and no f/u recommended per patient report    Shala-Danlos syndrome     hypermobile EDS    Screening for breast cancer     monthly self breast exams, annual clinical breast exams, baseline mammogram at 39 yo (9/2023), breast MRI (11/2023) f/u from abn baseline mammo per patient report    SVT (supraventricular tachycardia)       Past Medical History Pertinent Negatives:   Denies History Of: Date Noted    Fibrocystic breast 11/28/2023    Fibroid 11/28/2023    Ovarian cyst 11/28/2023    Screening for colon cancer 11/28/2023     Past Surgical History:   Procedure Laterality Date    Knee ligament reconstruction Right     [place] tooth extraction       Past Surgical History Pertinent Negatives:   Denies History Of: Date Noted    Breast biopsy 11/28/2023    Colonoscopy 11/28/2023        Height/Weight (previously recorded at physician's office):  Height: 1.702 m (5' 7\")  Weight: 79.4 kg (175 lb)    Gynecologic History:  Menarche Age: 13 years  Age at first live birth: 26  Menopause Status: Pre-Menopause  Use of hormonal contraceptives: Yes (July 2023-current)  Use of fertility medications: No  Use of hormone replacement therapy: No  Use of Tamoxifen/Evista: No    Social History     Tobacco Use    Smoking status: Never    Smokeless tobacco: Never   Substance Use Topics    Alcohol use: Yes     Comment: occasionally    Drug use: Never       Family History:  See completed family history in pedigree below.    Genetic Education/Risk Assessment/Counseling:  Information was provided about the relationship between genes and disease.  The concept of " heredity was defined.  Natural history, risks and inheritance patterns were reviewed, as related to Lupe personal and/or family history.  Related psychosocial aspects were discussed.    Discussion of Genetic Testing:  The pros, cons, and limitations of testing for genetic susceptibility were discussed, including but not limited to test options, possible results, potential impact on management, and psychosocial aspects.  There may be limited data on the degree of risk and/or no defined management guidelines associated with some genes.  If applicable, risk assessment models and/or published tables were used to provide a mutation probability estimate. Limitations of assessment were reviewed.    Given the reported personal and/or family history, Luep wished to pursue genetic testing. The following testing was ordered:    [facility]  Multi-Cancer Panel + RNA Analysis.   *Of note, Lupe will present to [facility] to provide a blood sample on 12/13/2023 at 8:30AM     Plan:  Lupe was confirmed to have understood the aforementioned information and was assisted with decision making as needed.  Informational and supportive resources were provided. Consent was obtained to share chart note(s) with physicians. Lupe is encouraged to contact the program with personal/family history updates. Lupe will be contacted via telephone when genetic test results are available. Guidelines for health management will be reviewed at that time as appropriate.        A total of 30 minutes was spent providing genetic counseling to Lupe.

## 2023-11-28 NOTE — LETTER
11/28/23    Lupe Segal  [address] [facility] [address]    Dear Ms. Segal,    Thank you for participating in the [facility].  It was a pleasure working with you. Attached is documentation from our discussion(s). It will also be sent to any physicians you indicated.      You may also choose to share this documentation with your family members. Because cancer risk is based on both personal and both maternal and paternal family history factors, as well as mutation status, your relatives are recommended to review their risks and genetic testing options (if applicable) with their own healthcare providers to derive a risk-appropriate, individualized plan.      If you have any questions, concerns, or updates to your personal/family history, please contact the [facility] at [phone] to further review your case.    Please see below for your encounter report.    Sincerely,     Juanita Salinas AHSAN                            Encounter Note    Indication for Appointment:  Lupe Segal presented for genetic counseling and risk assessment via a telemedicine encounter due to a family history of breast cancer. Lupe was referred by PAYTON Christianson and presented to the session alone.    Personal History:   Lupe is 38 y.o. female of [place] and [place] descent with primary visit diagnosis of Encounter for nonprocreative genetic counseling [Z71.83].    Lupe reports she underwent baseline mammogram on 09/14/2023 which showed bilateral breast abnormalities requiring follow-up ultrasond. Bilateral ultrasound revealed a complex cyst in the right breast and solid well-circumscribed hypoechoic mass in the left breast which corresponds to the mass seen on mammography. Biopsy was recommended, however Lupe opted to pursue breast MRI prior to biopsy due to  intolerance to local anesthetics. Breast MRI was performed on  "11/7/2023 which showed no abnormalities in the right breast and no focal abnormal enhancement identified in the left breast to correspond to mammographic/sonographic imaging. Lupe wishes to pursue genetic testing to aid in further decision making regarding biopsy.     Past Medical History:   Diagnosis Date    Disease of thyroid gland     U/S revealed nodule, benign and no f/u recommended per patient report    Shala-Danlos syndrome     hypermobile EDS    Screening for breast cancer     monthly self breast exams, annual clinical breast exams, baseline mammogram at 37 yo (9/2023), breast MRI (11/2023) f/u from abn baseline mammo per patient report    SVT (supraventricular tachycardia)       Past Medical History Pertinent Negatives:   Denies History Of: Date Noted    Fibrocystic breast 11/28/2023    Fibroid 11/28/2023    Ovarian cyst 11/28/2023    Screening for colon cancer 11/28/2023     Past Surgical History:   Procedure Laterality Date    Knee ligament reconstruction Right     [place] tooth extraction       Past Surgical History Pertinent Negatives:   Denies History Of: Date Noted    Breast biopsy 11/28/2023    Colonoscopy 11/28/2023        Height/Weight (previously recorded at physician's office):  Height: 1.702 m (5' 7\")  Weight: 79.4 kg (175 lb)    Gynecologic History:  Menarche Age: 13 years  Age at first live birth: 26  Menopause Status: Pre-Menopause  Use of hormonal contraceptives: Yes (July 2023-current)  Use of fertility medications: No  Use of hormone replacement therapy: No  Use of Tamoxifen/Evista: No    Social History     Tobacco Use    Smoking status: Never    Smokeless tobacco: Never   Substance Use Topics    Alcohol use: Yes     Comment: occasionally    Drug use: Never       Family History:  See completed family history in pedigree below.    Genetic Education/Risk Assessment/Counseling:  Information was provided about the relationship between genes and disease.  The concept of heredity was defined. "  Natural history, risks and inheritance patterns were reviewed, as related to Lupe personal and/or family history.  Related psychosocial aspects were discussed.    Discussion of Genetic Testing:  The pros, cons, and limitations of testing for genetic susceptibility were discussed, including but not limited to test options, possible results, potential impact on management, and psychosocial aspects.  There may be limited data on the degree of risk and/or no defined management guidelines associated with some genes.  If applicable, risk assessment models and/or published tables were used to provide a mutation probability estimate. Limitations of assessment were reviewed.    Given the reported personal and/or family history, Lupe wished to pursue genetic testing. The following testing was ordered:    [facility]  Multi-Cancer Panel + RNA Analysis.   *Of note, Lupe will present to [facility] to provide a blood sample on 12/13/2023 at 8:30AM     Plan:  Lupe was confirmed to have understood the aforementioned information and was assisted with decision making as needed.  Informational and supportive resources were provided. Consent was obtained to share chart note(s) with physicians. Lupe is encouraged to contact the program with personal/family history updates. Lupe will be contacted via telephone when genetic test results are available. Guidelines for health management will be reviewed at that time as appropriate.        A total of 30 minutes was spent providing genetic counseling to Lupe.

## 2023-11-28 NOTE — Clinical Note
11/28/23    Lupe Segal  [address] [place] [id number]    Dear Ms. Segal,    Thank you for participating in the [facility].  It was a pleasure working with you. Attached is documentation from our discussion(s). It will also be sent to any physicians you indicated.      You may also choose to share this documentation with your family members. Because cancer risk is based on both personal and both maternal and paternal family history factors, as well as mutation status, your relatives are recommended to review their risks and genetic testing options (if applicable) with their own healthcare providers to derive a risk-appropriate, individualized plan.      If you have any questions, concerns, or updates to your personal/family history, please contact the [facility] at [phone] to further review your case.    Please see below for your encounter report.    Sincerely,     MAYITO Eller LCGC        Encounter Note    Indication for Appointment:  Lupe Segal presented for genetic counseling and risk assessment via a telemedicine encounter due to a family history of breast cancer. Lupe was referred by PAYTON Christianson and presented to the session alone.    Personal History:   Lupe is 38 y.o. female of [place] and [place] descent with primary visit diagnosis of Encounter for nonprocreative genetic counseling [Z71.83].    Lupe reports she underwent baseline mammogram on 09/14/2023 which showed bilateral breast abnormalities requiring follow-up ultrasond. Bilateral ultrasound revealed a complex cyst in the right breast and solid well-circumscribed hypoechoic mass in the left breast which corresponds to the mass seen on mammography. Biopsy was discussed, however Lupe reports an intolerance to local anesthetics and opted to pursue breast MRI prior to biopsy. Breast MRI was  "performed on 11/7/2023 which showed no abnormalities in the right breast and no focal abnormal enhancement identified in the left breast to correspond to mammographic/sonographic imaging. Lupe wishes to pursue genetic testing to aid in further decision making regarding biopsy.     Past Medical History:   Diagnosis Date    Disease of thyroid gland     U/S revealed nodule, benign and no f/u recommended per patient report    Shala-Danlos syndrome     hypermobile EDS    Screening for breast cancer     monthly self breast exams, annual clinical breast exams, baseline mammogram at 37 yo (9/2023), breast MRI (11/2023) f/u from abn baseline mammo per patient report    SVT (supraventricular tachycardia)       Past Medical History Pertinent Negatives:   Denies History Of: Date Noted    Fibrocystic breast 11/28/2023    Fibroid 11/28/2023    Ovarian cyst 11/28/2023    Screening for colon cancer 11/28/2023     Past Surgical History:   Procedure Laterality Date    Knee ligament reconstruction Right     [place] tooth extraction       Past Surgical History Pertinent Negatives:   Denies History Of: Date Noted    Breast biopsy 11/28/2023    Colonoscopy 11/28/2023        Height/Weight (previously recorded at physician's office):  Height: 1.702 m (5' 7\")  Weight: 79.4 kg (175 lb)    Gynecologic History:  Menarche Age: 13 years  Age at first live birth: 26  Menopause Status: Pre-Menopause  Use of hormonal contraceptives: Yes (July 2023-current)  Use of fertility medications: No  Use of hormone replacement therapy: No  Use of Tamoxifen/Evista: No    Social History     Tobacco Use    Smoking status: Never    Smokeless tobacco: Never   Substance Use Topics    Alcohol use: Yes     Comment: occasionally    Drug use: Never       Family History:  See completed family history in pedigree below.    Genetic Education/Risk Assessment/Counseling:  Information was provided about the relationship between genes and disease.  The " concept of heredity was defined.  Natural history, risks and inheritance patterns were reviewed, as related to Lupe personal and/or family history.  Related psychosocial aspects were discussed.    Discussion of Genetic Testing:  The pros, cons, and limitations of testing for genetic susceptibility were discussed, including but not limited to test options, possible results, potential impact on management, and psychosocial aspects.  There may be limited data on the degree of risk and/or no defined management guidelines associated with some genes.  If applicable, risk assessment models and/or published tables were used to provide a mutation probability estimate. Limitations of assessment were reviewed.    Given the reported personal and/or family history, Lupe wished to pursue genetic testing. The following testing was ordered:    [facility]  Multi-Cancer Panel + RNA Analysis.   *Of note, Lupe will present to [facility] to provide a blood sample on 12/13/2023 at 8:30AM     Plan:  Lupe was confirmed to have understood the aforementioned information and was assisted with decision making as needed.  Informational and supportive resources were provided. Consent was obtained to share chart note(s) with physicians. Lupe is encouraged to contact the program with personal/family history updates. Lupe will be contacted via telephone when genetic test results are available. Guidelines for health management will be reviewed at that time as appropriate.        A total of 30 minutes was spent providing genetic counseling to Lupe.

## 2023-11-30 ENCOUNTER — CLINICAL SUPPORT (OUTPATIENT)
Dept: GENETICS | Facility: HOSPITAL | Age: 38
End: 2023-11-30
Attending: INTERNAL MEDICINE
Payer: COMMERCIAL

## 2023-11-30 ENCOUNTER — TELEPHONE (OUTPATIENT)
Dept: GENETICS | Facility: HOSPITAL | Age: 38
End: 2023-11-30
Payer: COMMERCIAL

## 2023-11-30 DIAGNOSIS — Z80.3 FHX: BREAST CANCER: ICD-10-CM

## 2023-11-30 DIAGNOSIS — Z71.83 ENCOUNTER FOR NONPROCREATIVE GENETIC COUNSELING: ICD-10-CM

## 2023-12-13 ENCOUNTER — HOSPITAL ENCOUNTER (OUTPATIENT)
Dept: RADIOLOGY | Facility: HOSPITAL | Age: 38
Discharge: HOME | End: 2023-12-13
Attending: SURGERY
Payer: COMMERCIAL

## 2023-12-13 ENCOUNTER — OFFICE VISIT (OUTPATIENT)
Dept: SURGERY | Facility: CLINIC | Age: 38
End: 2023-12-13
Payer: COMMERCIAL

## 2023-12-13 ENCOUNTER — CLINICAL SUPPORT (OUTPATIENT)
Dept: GENETICS | Facility: HOSPITAL | Age: 38
End: 2023-12-13
Attending: INTERNAL MEDICINE
Payer: COMMERCIAL

## 2023-12-13 ENCOUNTER — PATIENT OUTREACH (OUTPATIENT)
Dept: RADIOLOGY | Facility: HOSPITAL | Age: 38
End: 2023-12-13
Payer: COMMERCIAL

## 2023-12-13 VITALS
WEIGHT: 175 LBS | HEIGHT: 67 IN | BODY MASS INDEX: 27.47 KG/M2 | RESPIRATION RATE: 18 BRPM | OXYGEN SATURATION: 99 % | TEMPERATURE: 98 F | DIASTOLIC BLOOD PRESSURE: 87 MMHG | SYSTOLIC BLOOD PRESSURE: 128 MMHG | HEART RATE: 82 BPM

## 2023-12-13 DIAGNOSIS — R92.8 ABNORMAL MAMMOGRAM: ICD-10-CM

## 2023-12-13 DIAGNOSIS — Z80.3 FHX: BREAST CANCER: ICD-10-CM

## 2023-12-13 DIAGNOSIS — R92.8 ABNORMAL ULTRASOUND OF BREAST: ICD-10-CM

## 2023-12-13 DIAGNOSIS — Z91.89 AT RISK FOR BREAST CANCER: ICD-10-CM

## 2023-12-13 DIAGNOSIS — R92.8 ABNORMAL ULTRASOUND OF BREAST: Primary | ICD-10-CM

## 2023-12-13 DIAGNOSIS — R92.333 HETEROGENEOUSLY DENSE TISSUE OF BOTH BREASTS ON MAMMOGRAPHY: ICD-10-CM

## 2023-12-13 DIAGNOSIS — Z71.83 ENCOUNTER FOR NONPROCREATIVE GENETIC COUNSELING: ICD-10-CM

## 2023-12-13 DIAGNOSIS — Z80.3 FAMILY HISTORY OF MALIGNANT NEOPLASM OF BREAST: ICD-10-CM

## 2023-12-13 PROCEDURE — 99213 OFFICE O/P EST LOW 20 MIN: CPT | Performed by: SURGERY

## 2023-12-13 PROCEDURE — 3008F BODY MASS INDEX DOCD: CPT | Performed by: SURGERY

## 2023-12-13 PROCEDURE — 36415 COLL VENOUS BLD VENIPUNCTURE: CPT | Performed by: GENETIC COUNSELOR, MS

## 2023-12-13 PROCEDURE — 76642 ULTRASOUND BREAST LIMITED: CPT | Mod: 50

## 2023-12-13 ASSESSMENT — PAIN SCALES - GENERAL: PAINLEVEL: 0-NO PAIN

## 2023-12-13 NOTE — LETTER
March 2, 2024     Og Nina,   [address]  [facility] [address]    Patient: Lupe Segal  YOB: 1985  Date of Visit: 12/13/2023      Dear Dr. Nina:    Thank you for referring Lupe Segal to me for evaluation. Below are my notes for this consultation.    If you have questions, please do not hesitate to call me. I look forward to following your patient along with you.         Sincerely,        Bri Bazan MD        CC: MD Hortensia Bazzi, Bri SHAW MD  3/2/2024 11:13 AM  Sign when Signing Visit  Nunu comes to the office today accompanied by her  for follow-up of the previous breast imaging abnormalities.  She initially had a mammogram in September through axial imaging which showed bilateral breast masses.  She has significant difficulty with mammography given her Shala-Danlos syndrome.  At that time they did evaluate the masses further with breast MRI where there was nothing suspicious identified.  Discussion of possible biopsy has caused her significant anxiety as she has difficulty with local anesthetics being effective.  Given this, 6-month follow-up bilateral ultrasound was recommended.    There are no changes in her past medical history, past surgical history, medications or allergies.    There are no changes in her family history.    ROS:   Significant for no abnormalities except for those related to her Shala-Danlos.    Physical exam: Well-developed, well-nourished female in no apparent distress.  She is alert and oriented ×3 and her mood and affect are appropriate.  Her HEENT has no cervical or supraclavicular adenopathy.  There are no thyroid masses.  Her breast examination is symmetric.  There are no dominant masses, skin changes, nipple discharge or axillary adenopathy.        Assessment/Plan :  Problem List Items Addressed This Visit        Other    Abnormal mammogram     She is very reluctant to pursue further mammography given her  previous issues with the imaging.  She could potentially be screened with MRI or ultrasound.         At risk for breast cancer     She is a very [place] very minimally elevated increased risk of breast cancer due to her breast density and a remote family history of a paternal grandmother having postmenopausal cancer.  At this point she does not require any intervention chemoprevention.         Family history of malignant neoplasm of breast     At this point there is no strong indications for hereditary breast or ovarian cancer testing.         Abnormal ultrasound of breast - Primary     She would be due for 6-month follow-up ultrasound which is reasonable given the relatively benign appearance of these areas and lack of enhancement on MRI.         Relevant Orders    ULTRASOUND BREAST LIMITED LEFT    ULTRASOUND BREAST LIMITED LEFT         Return in about 3 months (around 3/13/2024).    Bri Bazan MD

## 2023-12-13 NOTE — PROGRESS NOTES
Patient Name: Lupe Segal  Patient Legal Name: Lupe Segal  : 1985     Indicaton for Appointment:  Lupe Segal returns to the Genetics and Risk Assessment Program today to provide a sample for genetic testing.    Genetic Education/Risk Assessment/Counseling:  Information was previously provided about the relationship between genes and disease. The concept of heredity was defined. Natural history, risks and inheritance patterns associated with cancer-associated genes were reviewed, as related to Lupe's personal and/or family history. Related psychosocial aspects were discussed.    Discussion of Genetic Testing:  The pros, cons, and limitations of testing for genetic susceptibility were discussed, including but not limited to test options, possible results, potential impact on management, and psychosocial aspects.  There may be limited data on the degree of cancer risk and/or no defined management guidelines associated with some genes.  If applicable, risk assessment models and/or published tables were used to provide a mutation probability estimate. Limitations of assessment were reviewed.    Given the reported personal and/or family history, genetic testing was offered and accepted.  The following test was ordered:      [facility]  Multi-Cancer Panel + RNA Analysis.      Plan:  Elis confirmed to have understood the aforementioned information and was assisted with decision making as needed.  Informational and supportive resources were provided. Consent was obtained to share chart note(s) with physicians.  Lupe will be contacted via telephone when genetic test results are available. Lupe should contact the program with personal/family history updates and/or to inquire about new information specific to this case.    A total of <15 minutes was spent providing genetic counseling to Lupe.

## 2024-01-02 ENCOUNTER — TELEPHONE (OUTPATIENT)
Dept: GENETICS | Facility: HOSPITAL | Age: 39
End: 2024-01-02
Payer: COMMERCIAL

## 2024-01-02 NOTE — LETTER
01/02/24    Bri Bazan MD  [address]  [address]  [place] [address]    Re:  Patient Preferred Name: Lupe Segal  Patient Legal Name: Lupe Williamiggs    Dear Dr. Bazan,    Thank you for referring your patient, Lupe Segal, to receive care through my office. I have enclosed a summary of the care provided to Lupe on 01/02/24.    Please contact me with any questions you may have regarding the visit.    Sincerely,     MAYITO Eller LCGC    [address] [place] [address]  [phone]    CC: No Recipients

## 2024-01-02 NOTE — TELEPHONE ENCOUNTER
Patient Name: Lupe Segal  Patient Legal Name: Lupe Segal  : 1985     Indication for Appointment:  Lupe Segal was seen by the Cancer Risk Assessment and Genetics Program via a telemedicine encounter due to family history of breast cancer. Genetic testing was performed.    Lupe was contacted by telephone today to discuss genetic test results, risk-based management guidelines and any potential additional test options. Follow up appointments to discuss the results in more detail with our medical director may be scheduled by contacting the Cancer Risk Assessment and Genetics Program.    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 (p.[id number])  LAB/TEST:  [facility]  Multi-Cancer Panel + RNA Analysis    70 Genes Analyzed (see scanned results for detailed description of testing technology):  AIP, ALK, APC, DEYANIRA, AXIN2, BAP1, BARD1, BLM, BMPR1A, BRCA1, BRCA2, BRIP1, CDC73, CDH1, CDK4, CDKN1B, CDKN2A (p14ARF), CDKN2A (m64FUH4r), CHEK2, CTNNA1, DICER1, EGFR, EPCAM, FH, FLCN, GREM1, HOXB13, KIT, LZTR1, MAX, MBD4, MEN1, MET, MITF, MLH1, MSH2, MSH3, MSH6, MUTYH, NF1, NF2, NTHL1, PALB2, PDGFRA, PMS2, POLD1, POLE, POT1, RDYIL1C, PTCH1, PTEN, RAD51C, RAD51D, RB1, RET, SDHA, SDHAF2, SDHB, SDHC, SDHD, SMAD4, SMARCA4, SMARCB1, SMARCE1, STK11, SUFU, HQQH006, TP53, TSC1, TSC2, VHL    After receiving consent, the following result(s) were disclosed to Lupe:   - A genetic variant of uncertain significance (as classified by this laboratory) was identified in the BAP1 gene as noted above. Of note, there may be discordance among laboratories regarding variant classification.  The variant(s) identified were confirmed in the ClinVar database when possible.    All individuals have variations in their DNA and most of these variants do not increase risks for cancer or other conditions.  A variant of  uncertain significance means that, at the time of testing, the laboratory is not able to determine if the variant identified impacts health.  The laboratory studies variants in an effort to confirm if they are pathogenic or benign.  Should the Cancer Risk Assessment and Genetics Program be notified by the testing laboratory of a reclassification of the variant(s) identified, Lupe will be notified, if possible. The identified variant is not eligible for complimentary family studies at this time, additional information regarding this may be found by contacting the laboratory.   It is not recommended to test unaffected relatives for variants of uncertain significance outside of the research setting.    No reportable variants were identified in the other gene(s) tested.  It was explained to Lupe that with respect to the variant identified, this result is considered inconclusive.  Furthermore, a pathogenic mutation could be present in the gene(s) tested that cannot be detected by the current tests performed or in a gene not tested.  Additionally, biological family members may have a mutation in any of the genes tested Lupe does not given the negative result.  Lupe may have the option of participating in laboratory studies, registries and/or clinical trials, if available, regarding genetic variants and is encouraged to contact the program and/or testing laboratory for more information if interested.      Personal History:   Lupe is a 38 y.o. female of [place] and [place] descent.    Lupe reports she underwent baseline mammogram on 09/14/2023 which showed bilateral breast abnormalities requiring follow-up ultrasond. Bilateral ultrasound revealed a complex cyst in the right breast and solid well-circumscribed hypoechoic mass in the left breast which corresponds to the mass seen on mammography. Biopsy was recommended, however Lupe opted to pursue breast MRI prior to biopsy due to local anesthetics  intolerance. Breast MRI was performed on 11/7/2023 which showed no abnormalities in the right breast and no focal abnormal enhancement identified in the left breast to correspond to mammographic/sonographic imaging. Lupe wishes to pursue genetic testing to aid in further decision making regarding biopsy.      Past Medical History:   Diagnosis Date   • Disease of thyroid gland     U/S revealed nodule, benign and no f/u recommended per patient report   • Shala-Danlos syndrome     hypermobile EDS   • Screening for breast cancer     monthly self breast exams, annual clinical breast exams, baseline mammogram at 39 yo (9/2023), breast MRI (11/2023) f/u from abn baseline mammo per patient report   • SVT (supraventricular tachycardia)      Past Medical History Pertinent Negatives:   Denies History Of: Date Noted   • Fibrocystic breast 11/28/2023   • Fibroid 11/28/2023   • Ovarian cyst 11/28/2023   • Screening for colon cancer 11/28/2023     Past Surgical History:   Procedure Laterality Date   • Knee ligament reconstruction Right    • [place] tooth extraction       Past Surgical History Pertinent Negatives:   Denies History Of: Date Noted   • Breast biopsy 11/28/2023   • Colonoscopy 11/28/2023     Gynecologic History:  Menarche Age: 13 years  Age at first live birth: 26  Menopause Status: Pre-Menopause  Use of hormonal contraceptives: Yes (July 2023-current)  Use of fertility medications: No  Use of hormone replacement therapy: No  Use of Tamoxifen/Evista: No      Social History     Tobacco Use   • Smoking status: Never   • Smokeless tobacco: Never   Substance Use Topics   • Alcohol use: Yes     Comment: occasionally   • Drug use: Never       Family History:  See completed family history in pedigree below.      Risk Assessment and Management:     As a clinically actionable mutation was not identified by the current test method(s), the cancer risks and guidelines reviewed are based on the personal and/or family history  provided. As guidelines continually change and the efficacy of screening for some cancers remains under investigation, Lupe Segal is encouraged to review personal and family history with managing physician(s) regularly.    Site of Surveillance Coordinating Provider Recommendation   Breast Primary Care or Gynecology · Lifetime risk of developing breast cancer was calculated using the Tyrer-Cuzick model and is estimated to be that of the general population.  · Breast cancer risk is dynamic and can increase with age and other factors.   · Breast cancer risk assessment should be reviewed at medical visits.  · A high lifetime risk for developing breast cancer is typically 20-25% or higher.  National Comprehensive Cancer Network (NCCN) guidelines for breast cancer screening include:   Breast awareness with prompt reporting of any noticed changes to healthcare provider    Annual clinical visit including clinical breast exam and ongoing risk assessment starting at age 25  Annual mammogram beginning by age 40     Gynecologic Gynecology · Pelvic exam and pap test annually or as directed by physician.      Gastrointestinal PCP  Gastroenterology · Guidelines for when to begin colorectal cancer screening in average risk individuals vary between age 45 and 50; thus, an appropriate screening plan based on personal factors, such as age, race, and symptoms, as well as family history, should be determined by screening gastroenterologist.      Skin PCP  Dermatology · Lupe is encouraged to practice skin protective behaviors, such as:  · Annual full-body skin examination   · Use of sun protective barriers such as sunscreens, clothing, hats and UV protective sunglasses with avoidance of mid-day sun, chronic sun exposure, and tanning beds is strongly recommended   · Awareness of ABCDEs of melanoma (asymmetry, border, color, diameter, evolution).        General Management PCP · Annual physical examination is  encouraged.  · Adherence to a healthy lifestyle, including body mass index (BMI) <25 obtained through balanced diet and exercise  · Limit intake of alcoholic beverages to less than 1 drink per day (serving equals 1 ounce of liquor, 6 ounces of wine or 8 ounces of beer)  · Not smoking.         Plan:  Because health risks are based on personal and both maternal and paternal family history factors, other relatives are encouraged to consider risk assessment and/or genetic evaluation to derive a risk-appropriate, individualized plan.  Should family member(s) be interested in genetic evaluation, the Genetics and Risk Assessment Program can provide consultation or help to find a genetic counselor in their area.    The information provided reflects current practice guidelines and may change with new medical discoveries/technology/updated personal or family history information.  Lupe was confirmed to have understood the aforementioned information and was assisted with decision making as needed.  Informational and supportive resources were provided.  Potential psychosocial ramifications related to test results were reviewed.  Consent was obtained to share chart note(s) with physicians.  Lupe plans to discuss the above information with physicians to determine an optimal risk management plan.    Lupe should contact the program with personal/family history updates as this could alter the guidelines provided and/or available test options and/or to inquire about new information specific to this case.   As stated, there may be other genes associated with cancer risk for which Lupe was not tested.  Lupe was encouraged to contact the genetics program at [phone] with any questions or concerns and/or to periodically review test options and related insurance coverage.    Based on the reported history of significant cardiovascular conditions in the family, consideration of cardiovascular genetics evaluation is  suggested. More information is available at [email or web address] or by calling [phone]).

## 2024-01-02 NOTE — LETTER
01/02/24    Lupe Segal  [address] [place] [id number]    Dear Ms. Segal,    Thank you for participating in the [facility] Risk Assessment [facility] Genetics Program.  It was a pleasure working with you. Attached is documentation from our discussion(s). It will also be sent to any physicians you indicated.      You may also choose to share this documentation with your family members. Because cancer risk is based on both personal and both maternal and paternal family history factors, as well as mutation status, your relatives are recommended to review their risks and genetic testing options (if applicable) with their own healthcare providers to derive a risk-appropriate, individualized plan.      If you have any questions, concerns, or updates to your personal/family history, please contact the [facility] Risk Assessment and Genetics Program at [phone] to further review your case.    Please see below for your encounter report.    Sincerely,     Juanita Salinas, [facility]                          Encounter Note       Indication for Appointment:  Lupe Segal was seen by the Cancer Risk Assessment and Genetics Program via a telemedicine encounter due to family history of breast cancer. Genetic testing was performed.    Lupe was contacted by telephone today to discuss genetic test results, risk-based management guidelines and any potential additional test options. Follow up appointments to discuss the results in more detail with our medical director may be scheduled by contacting the Cancer Risk Assessment and Genetics Program.    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 (p.[id number])  LAB/TEST:  [facility]  Multi-Cancer Panel + RNA Analysis    70 Genes Analyzed (see scanned results for detailed description of testing technology):  AIP, ALK, APC, DEYANIRA, AXIN2, BAP1, BARD1, BLM,  BMPR1A, BRCA1, BRCA2, BRIP1, CDC73, CDH1, CDK4, CDKN1B, CDKN2A (p14ARF), CDKN2A (s10ENH2y), CHEK2, CTNNA1, DICER1, EGFR, EPCAM, FH, FLCN, GREM1, HOXB13, KIT, LZTR1, MAX, MBD4, MEN1, MET, MITF, MLH1, MSH2, MSH3, MSH6, MUTYH, NF1, NF2, NTHL1, PALB2, PDGFRA, PMS2, POLD1, POLE, POT1, IQEPF5C, PTCH1, PTEN, RAD51C, RAD51D, RB1, RET, SDHA, SDHAF2, SDHB, SDHC, SDHD, SMAD4, SMARCA4, SMARCB1, SMARCE1, STK11, SUFU, RTZS352, TP53, TSC1, TSC2, VHL    After receiving consent, the following result(s) were disclosed to Lupe:   - A genetic variant of uncertain significance (as classified by this laboratory) was identified in the BAP1 gene as noted above. Of note, there may be discordance among laboratories regarding variant classification.  The variant(s) identified were confirmed in the ClinVar database when possible.    All individuals have variations in their DNA and most of these variants do not increase risks for cancer or other conditions.  A variant of uncertain significance means that, at the time of testing, the laboratory is not able to determine if the variant identified impacts health.  The laboratory studies variants in an effort to confirm if they are pathogenic or benign.  Should the Cancer Risk Assessment and Genetics Program be notified by the testing laboratory of a reclassification of the variant(s) identified, Lupe will be notified, if possible. The identified variant is not eligible for complimentary family studies at this time, additional information regarding this may be found by contacting the laboratory.   It is not recommended to test unaffected relatives for variants of uncertain significance outside of the research setting.    No reportable variants were identified in the other gene(s) tested.  It was explained to Lupe that with respect to the variant identified, this result is considered inconclusive.  Furthermore, a pathogenic mutation could be present in the gene(s) tested that cannot be  detected by the current tests performed or in a gene not tested.  Additionally, biological family members may have a mutation in any of the genes tested Lupe does not given the negative result.  Lupe may have the option of participating in laboratory studies, registries and/or clinical trials, if available, regarding genetic variants and is encouraged to contact the program and/or testing laboratory for more information if interested.      Personal History:   Lupe is a 38 y.o. female of [place] descent.    Lupe reports she underwent baseline mammogram on 09/14/2023 which showed bilateral breast abnormalities requiring follow-up ultrasond. Bilateral ultrasound revealed a complex cyst in the right breast and solid well-circumscribed hypoechoic mass in the left breast which corresponds to the mass seen on mammography. Biopsy was recommended, however Lupe opted to pursue breast MRI prior to biopsy due to local anesthetics intolerance. Breast MRI was performed on 11/7/2023 which showed no abnormalities in the right breast and no focal abnormal enhancement identified in the left breast to correspond to mammographic/sonographic imaging. Lupe wishes to pursue genetic testing to aid in further decision making regarding biopsy.      Past Medical History:   Diagnosis Date   • Disease of thyroid gland     U/S revealed nodule, benign and no f/u recommended per patient report   • Shala-Danlos syndrome     hypermobile EDS   • Screening for breast cancer     monthly self breast exams, annual clinical breast exams, baseline mammogram at 39 yo (9/2023), breast MRI (11/2023) f/u from abn baseline mammo per patient report   • SVT (supraventricular tachycardia)      Past Medical History Pertinent Negatives:   Denies History Of: Date Noted   • Fibrocystic breast 11/28/2023   • Fibroid 11/28/2023   • Ovarian cyst 11/28/2023   • Screening for colon cancer 11/28/2023     Past Surgical History:   Procedure  Laterality Date   • Knee ligament reconstruction Right    • [place] tooth extraction       Past Surgical History Pertinent Negatives:   Denies History Of: Date Noted   • Breast biopsy 11/28/2023   • Colonoscopy 11/28/2023     Gynecologic History:  Menarche Age: 13 years  Age at first live birth: 26  Menopause Status: Pre-Menopause  Use of hormonal contraceptives: Yes (July 2023-current)  Use of fertility medications: No  Use of hormone replacement therapy: No  Use of Tamoxifen/Evista: No      Social History     Tobacco Use   • Smoking status: Never   • Smokeless tobacco: Never   Substance Use Topics   • Alcohol use: Yes     Comment: occasionally   • Drug use: Never       Family History:  See completed family history in pedigree below.      Risk Assessment and Management:    As a clinically actionable mutation was not identified by the current test method(s), the cancer risks and guidelines reviewed are based on the personal and/or family history provided. As guidelines continually change and the efficacy of screening for some cancers remains under investigation, Lupe Segal is encouraged to review personal and family history with managing physician(s) regularly.    Site of Surveillance Coordinating Provider Recommendation   Breast Primary Care or Gynecology · Lifetime risk of developing breast cancer was calculated using the Tyrer-Cuzick model and is estimated to be that of the general population.  · Breast cancer risk is dynamic and can increase with age and other factors.   · Breast cancer risk assessment should be reviewed at medical visits.  · A high lifetime risk for developing breast cancer is typically 20-25% or higher.  ? National Comprehensive Cancer Network (NCCN) guidelines for breast cancer screening include:   ? Breast awareness with prompt reporting of any noticed changes to healthcare provider    ? Annual clinical visit including clinical breast exam and ongoing risk assessment starting at age  25  ? Annual mammogram beginning by age 40     Gynecologic Gynecology · Pelvic exam and pap test annually or as directed by physician.      Gastrointestinal PCP  Gastroenterology · Guidelines for when to begin colorectal cancer screening in average risk individuals vary between age 45 and 50; thus, an appropriate screening plan based on personal factors, such as age, race, and symptoms, as well as family history, should be determined by screening gastroenterologist.      Skin PCP  Dermatology · Lupe is encouraged to practice skin protective behaviors, such as:  · Annual full-body skin examination   · Use of sun protective barriers such as sunscreens, clothing, hats and UV protective sunglasses with avoidance of mid-day sun, chronic sun exposure, and tanning beds is strongly recommended   · Awareness of ABCDEs of melanoma (asymmetry, border, color, diameter, evolution).       General Management PCP · Annual physical examination is encouraged.  · Adherence to a healthy lifestyle, including body mass index (BMI) <25 obtained through balanced diet and exercise  · Limit intake of alcoholic beverages to less than 1 drink per day (serving equals 1 ounce of liquor, 6 ounces of wine or 8 ounces of beer)  · Not smoking.         Plan:  Because health risks are based on personal and both maternal and paternal family history factors, other relatives are encouraged to consider risk assessment and/or genetic evaluation to derive a risk-appropriate, individualized plan.  Should family member(s) be interested in genetic evaluation, the Genetics and Risk Assessment Program can provide consultation or help to find a genetic counselor in their area.    The information provided reflects current practice guidelines and may change with new medical discoveries/technology/updated personal or family history information.  Lupe was confirmed to have understood the aforementioned information and was assisted with decision making as  needed.  Informational and supportive resources were provided.  Potential psychosocial ramifications related to test results were reviewed.  Consent was obtained to share chart note(s) with physicians.  Lupe plans to discuss the above information with physicians to determine an optimal risk management plan.    Lupe should contact the program with personal/family history updates as this could alter the guidelines provided and/or available test options and/or to inquire about new information specific to this case.   As stated, there may be other genes associated with cancer risk for which Lupe was not tested.  Lupe was encouraged to contact the genetics program at [phone] with any questions or concerns and/or to periodically review test options and related insurance coverage.    Based on the reported history of significant cardiovascular conditions in the family, consideration of cardiovascular genetics evaluation is suggested. More information is available at [email or web address] or by calling [phone]).

## 2024-01-04 LAB
LYMPH SUBSET INTERP BLD FC-IMP: NORMAL
SCAN RESULT: NORMAL

## 2024-03-02 NOTE — PROGRESS NOTES
Nunu comes to the office today accompanied by her  for follow-up of the previous breast imaging abnormalities.  She initially had a mammogram in September through axial imaging which showed bilateral breast masses.  She has significant difficulty with mammography given her Shala-Danlos syndrome.  At that time they did evaluate the masses further with breast MRI where there was nothing suspicious identified.  Discussion of possible biopsy has caused her significant anxiety as she has difficulty with local anesthetics being effective.  Given this, 6-month follow-up bilateral ultrasound was recommended.    There are no changes in her past medical history, past surgical history, medications or allergies.    There are no changes in her family history.    ROS:   Significant for no abnormalities except for those related to her Shala-Danlos.    Physical exam: Well-developed, well-nourished female in no apparent distress.  She is alert and oriented ×3 and her mood and affect are appropriate.  Her HEENT has no cervical or supraclavicular adenopathy.  There are no thyroid masses.  Her breast examination is symmetric.  There are no dominant masses, skin changes, nipple discharge or axillary adenopathy.        Assessment/Plan :  Problem List Items Addressed This Visit        Other    Abnormal mammogram     She is very reluctant to pursue further mammography given her previous issues with the imaging.  She could potentially be screened with MRI or ultrasound.         At risk for breast cancer     She is a very [place] very minimally elevated increased risk of breast cancer due to her breast density and a remote family history of a paternal grandmother having postmenopausal cancer.  At this point she does not require any intervention chemoprevention.         Family history of malignant neoplasm of breast     At this point there is no strong indications for hereditary breast or ovarian cancer testing.         Abnormal  ultrasound of breast - Primary     She would be due for 6-month follow-up ultrasound which is reasonable given the relatively benign appearance of these areas and lack of enhancement on MRI.         Relevant Orders    ULTRASOUND BREAST LIMITED LEFT    ULTRASOUND BREAST LIMITED LEFT          Return in about 3 months (around 3/13/2024).    Bri Bazan MD

## 2024-04-10 ENCOUNTER — HOSPITAL ENCOUNTER (OUTPATIENT)
Dept: RADIOLOGY | Facility: HOSPITAL | Age: 39
Discharge: HOME | End: 2024-04-10
Attending: SURGERY
Payer: COMMERCIAL

## 2024-04-10 ENCOUNTER — OFFICE VISIT (OUTPATIENT)
Dept: SURGERY | Facility: CLINIC | Age: 39
End: 2024-04-10
Payer: COMMERCIAL

## 2024-04-10 VITALS
TEMPERATURE: 98 F | HEART RATE: 103 BPM | RESPIRATION RATE: 16 BRPM | OXYGEN SATURATION: 99 % | SYSTOLIC BLOOD PRESSURE: 134 MMHG | DIASTOLIC BLOOD PRESSURE: 90 MMHG

## 2024-04-10 DIAGNOSIS — R92.8 ABNORMAL MAMMOGRAM: ICD-10-CM

## 2024-04-10 DIAGNOSIS — Z91.89 AT RISK FOR BREAST CANCER: ICD-10-CM

## 2024-04-10 DIAGNOSIS — Q79.60 EHLERS-DANLOS SYNDROME: ICD-10-CM

## 2024-04-10 DIAGNOSIS — R92.333 HETEROGENEOUSLY DENSE TISSUE OF BOTH BREASTS ON MAMMOGRAPHY: ICD-10-CM

## 2024-04-10 DIAGNOSIS — Z80.3 FAMILY HISTORY OF MALIGNANT NEOPLASM OF BREAST: ICD-10-CM

## 2024-04-10 DIAGNOSIS — R92.8 ABNORMAL ULTRASOUND OF BREAST: ICD-10-CM

## 2024-04-10 DIAGNOSIS — R92.8 CATEGORY 3 MAMMOGRAPHY RESULT WITH SHORT FOLLOW-UP INTERVAL SUGGESTED FOR PROBABLY BENIGN FINDING: Primary | ICD-10-CM

## 2024-04-10 DIAGNOSIS — F43.11 ACUTE POSTTRAUMATIC STRESS DISORDER: ICD-10-CM

## 2024-04-10 PROCEDURE — 76642 ULTRASOUND BREAST LIMITED: CPT | Mod: LT

## 2024-04-10 PROCEDURE — 99214 OFFICE O/P EST MOD 30 MIN: CPT | Performed by: SURGERY

## 2024-04-10 ASSESSMENT — PAIN SCALES - GENERAL: PAINLEVEL: 0-NO PAIN

## 2024-04-10 NOTE — LETTER
April 15, 2024     Og Nina,   [address] [facility] [address]  [facility] [place] [address]    Patient: Lupe Segal  YOB: 1985  Date of Visit: 4/10/2024      Dear Dr. Nina:    Thank you for referring Lupe Segal to me for evaluation. Below are my notes for this consultation.    If you have questions, please do not hesitate to call me. I look forward to following your patient along with you.         Sincerely,        PAYTON Lomeli        CC: MD Vivian Bazzi, PAYTON [place]  4/10/2024 12:49 PM  Signed  Lupe returns to the office today, accompanied by her , for follow-up given a history of abnormal left breast imaging.  She had an MRI on November 7, 2023 which showed no evidence of malignancy in either breast although the left breast imaging was limited due to moderate background enhancement.  She had bilateral breast ultrasound on December 13, 2023 demonstrating a right breast cyst at 9:00, 2 to 3 cm from the nipple that measures 9 mm in greatest dimension and remained stable.  In the left breast there is a well-defined homogeneous hypoechoic mass at 5:00, 6 cm from the nipple measuring 9 x 7 x 5 mm.  This is present on the prior MRI of November 7, 2023 and consistent with a fibroadenoma.  6-month follow-up left breast ultrasound was  recommended for surveillance and was completed today.  In the left breast at 5:00, 6 cm from the nipple is a 0.6 x 0.9 x 1.1 cm ovoid circumscribed hypoechoic mass which is not significantly changed from previous breast imaging.  Slight difference in measurement may be attributed to technical differences of the provider.  6-month follow-up left breast ultrasound is recommended and will be due in October, 2024.  She will be due for bilateral breast imaging in November 2024.  The challenges she continues to experience high levels of anxiety and emotional distress as she reports she had a subluxation of the left clavicle at the time of  her mammogram last year at a [facility] facility.  She is actually considering not pursuing breast mammography in the future.  She has heterogeneously dense breast tissue.    There are no changes in her past medical history, past surgical history, medications or allergies.    There are no changes in her family history.    ROS:   Significant for no abnormalities in all systems reviewed.    Physical exam: Well-developed, well-nourished female in no apparent distress.  She is alert and oriented ×3 and her mood and affect are appropriate.  Her HEENT has no cervical or supraclavicular adenopathy.  There are no thyroid masses.  Her breast examination is symmetric.  There are no dominant masses, skin changes, nipple discharge or axillary adenopathy.  Assessment/Plan:  Problem List Items Addressed This Visit          Mental Health    Acute posttraumatic stress disorder     She has heterogeneously dense breast tissue which may limit the sensitivity of mammography however, given her report of a subluxation of the left clavicle at her mammogram last year at an outside facility, she is hesitant to pursue any further mammographic imaging.  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  She will be due for annual mammographic imaging in October, 2024 with a repeat left breast ultrasound.  She is hesitant but agreeable to schedule this and I have offered her a prescription for 1 Ativan prior to her mammogram.  She will contact our office when she schedules her mammogram if she wishes to utilize 1 tablet of Ativan prior to her mammogram.  She understand she cannot drive with this medication and will have her  bring her to the appointment.            Other    Heterogeneously dense tissue of both breasts on mammography     She has heterogeneously dense breast tissue.  No evidence of disease on clinical exam or follow-up sonographic imaging completed today.  She will be due for annual mammography in  October/November 2024 along with a left breast ultrasound.  We did discuss potential strategies to help her complete this imaging given her acute posttraumatic stress disorder related to reported subluxation of her left clavicle with her last mammogram at an outside facility.  She will consider follow-up breast MRI in the future but understands that this may be less sensitive than mammography.  We will discuss timing of a future MRI at her visit in 6 months.         Relevant Orders    BI DIAGNOSTIC MAMMOGRAM BILATERAL (TOMOSYNTHESIS)    ULTRASOUND BREAST LIMITED LEFT    At risk for breast cancer - Primary     According to the Tyrer-Cuzick model, she has an 18% elevated risk for breast cancer.  Given the new legislation in the [facility] she is a candidate for breast MRI given her heterogeneously dense breast tissue and family history of breast cancer.  She was not interested in chemoprevention given the potential side effects.  She will be due for annual mammographic imaging and a follow-up left breast ultrasound in fall, 2024.  Plan to see her back in the office at that time to discuss timing of breast MRI should she wish to pursue supplemental screening.         Relevant Orders    BI DIAGNOSTIC MAMMOGRAM BILATERAL (TOMOSYNTHESIS)    ULTRASOUND BREAST LIMITED LEFT    Family history of malignant neoplasm of breast     Her only family history of breast cancer is a paternal aunt who was diagnosed postmenopausal at 70.  She has not pursued genetic testing at this point which is not unreasonable.  She does understand that if any point she wishes to pursue genetic testing she can still give our office a call we will assist with an appointment with our genetic counseling team.         Relevant Orders    BI DIAGNOSTIC MAMMOGRAM BILATERAL (TOMOSYNTHESIS)    ULTRASOUND BREAST LIMITED LEFT     Other Visit Diagnoses       Category 3 mammography result with short follow-up interval suggested for probably benign  finding        Relevant Orders    BI DIAGNOSTIC MAMMOGRAM BILATERAL (TOMOSYNTHESIS)    ULTRASOUND BREAST LIMITED LEFT          She will be due for short interval follow-up left breast ultrasound in October, 2024.  A member of our office will assist with setting up her breast MRI in the fall.  At a minimum, we will plan to see her back in the office in 1 year with a new baseline bilateral mammogram.     Return in about 6 months (around 10/10/2024).    PAYTON Lomeli

## 2024-04-10 NOTE — ASSESSMENT & PLAN NOTE
She has heterogeneously dense breast tissue which may limit the sensitivity of mammography however, given her report of a subluxation of the left clavicle at her mammogram last year at an outside facility, she is hesitant to pursue any further mammographic imaging.  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  She will be due for annual mammographic imaging in October, 2024 with a repeat left breast ultrasound.  She is hesitant but agreeable to schedule this and I have offered her a prescription for 1 Ativan prior to her mammogram.  She will contact our office when she schedules her mammogram if she wishes to utilize 1 tablet of Ativan prior to her mammogram.  She understand she cannot drive with this medication and will have her  bring her to the appointment.

## 2024-04-10 NOTE — PROGRESS NOTES
Lupe returns to the office today, accompanied by her , for follow-up given a history of abnormal left breast imaging.  She had an MRI on November 7, 2023 which showed no evidence of malignancy in either breast although the left breast imaging was limited due to moderate background enhancement.  She had bilateral breast ultrasound on December 13, 2023 demonstrating a right breast cyst at 9:00, 2 to 3 cm from the nipple that measures 9 mm in greatest dimension and remained stable.  In the left breast there is a well-defined homogeneous hypoechoic mass at 5:00, 6 cm from the nipple measuring 9 x 7 x 5 mm.  This is present on the prior MRI of November 7, 2023 and consistent with a fibroadenoma.  6-month follow-up left breast ultrasound was  recommended for surveillance and was completed today.  In the left breast at 5:00, 6 cm from the nipple is a 0.6 x 0.9 x 1.1 cm ovoid circumscribed hypoechoic mass which is not significantly changed from previous breast imaging.  Slight difference in measurement may be attributed to technical differences of the provider.  6-month follow-up left breast ultrasound is recommended and will be due in October, 2024.  She will be due for bilateral breast imaging in November 2024.  The challenges she continues to experience high levels of anxiety and emotional distress as she reports she had a subluxation of the left clavicle at the time of her mammogram last year at a [facility] facility.  She is actually considering not pursuing breast mammography in the future.  She has heterogeneously dense breast tissue.    There are no changes in her past medical history, past surgical history, medications or allergies.    There are no changes in her family history.    ROS:   Significant for no abnormalities in all systems reviewed.    Physical exam: Well-developed, well-nourished female in no apparent distress.  She is alert and oriented ×3 and her mood and affect are appropriate.  Her HEENT  has no cervical or supraclavicular adenopathy.  There are no thyroid masses.  Her breast examination is symmetric.  There are no dominant masses, skin changes, nipple discharge or axillary adenopathy.  Assessment/Plan :  Problem List Items Addressed This Visit          Mental Health    Acute posttraumatic stress disorder     She has heterogeneously dense breast tissue which may limit the sensitivity of mammography however, given her report of a subluxation of the left clavicle at her mammogram last year at an outside facility, she is hesitant to pursue any further mammographic imaging.  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  She will be due for annual mammographic imaging in October, 2024 with a repeat left breast ultrasound.  She is hesitant but agreeable to schedule this and I have offered her a prescription for 1 Ativan prior to her mammogram.  She will contact our office when she schedules her mammogram if she wishes to utilize 1 tablet of Ativan prior to her mammogram.  She understand she cannot drive with this medication and will have her  bring her to the appointment.            Other    Heterogeneously dense tissue of both breasts on mammography     She has heterogeneously dense breast tissue.  No evidence of disease on clinical exam or follow-up sonographic imaging completed today.  She will be due for annual mammography in October/November 2024 along with a left breast ultrasound.  We did discuss potential strategies to help her complete this imaging given her acute posttraumatic stress disorder related to reported subluxation of her left clavicle with her last mammogram at an outside facility.  She will consider follow-up breast MRI in the future but understands that this may be less sensitive than mammography.  We will discuss timing of a future MRI at her visit in 6 months.         Relevant Orders    BI DIAGNOSTIC MAMMOGRAM BILATERAL (TOMOSYNTHESIS)    ULTRASOUND BREAST  LIMITED LEFT    At risk for breast cancer - Primary     According to the Tyrer-Cuzick model, she has an 18% elevated risk for breast cancer.  Given the new legislation in the [facility] she is a candidate for breast MRI given her heterogeneously dense breast tissue and family history of breast cancer.  She was not interested in chemoprevention given the potential side effects.  She will be due for annual mammographic imaging and a follow-up left breast ultrasound in fall, 2024.  Plan to see her back in the office at that time to discuss timing of breast MRI should she wish to pursue supplemental screening.         Relevant Orders    BI DIAGNOSTIC MAMMOGRAM BILATERAL (TOMOSYNTHESIS)    ULTRASOUND BREAST LIMITED LEFT    Family history of malignant neoplasm of breast     Her only family history of breast cancer is a paternal aunt who was diagnosed postmenopausal at 70.  She has not pursued genetic testing at this point which is not unreasonable.  She does understand that if any point she wishes to pursue genetic testing she can still give our office a call we will assist with an appointment with our genetic counseling team.         Relevant Orders    BI DIAGNOSTIC MAMMOGRAM BILATERAL (TOMOSYNTHESIS)    ULTRASOUND BREAST LIMITED LEFT     Other Visit Diagnoses       Category 3 mammography result with short follow-up interval suggested for probably benign finding        Relevant Orders    BI DIAGNOSTIC MAMMOGRAM BILATERAL (TOMOSYNTHESIS)    ULTRASOUND BREAST LIMITED LEFT          She will be due for short interval follow-up left breast ultrasound in October, 2024.  A member of our office will assist with setting up her breast MRI in the fall.  At a minimum, we will plan to see her back in the office in 1 year with a new baseline bilateral mammogram.     Return in about 6 months (around 10/10/2024).    PAYTON Lomeli

## 2024-04-10 NOTE — PATIENT INSTRUCTIONS
Andie will call you with the preauthorization for your breast MRI next October.  On the first day of your menstrual cycle in November you will call central scheduling at [phone] to schedule your breast MRI.  They will then assist you in setting up the MRI on day 7-14 after your cycle begins.  We will contact you with those results but at a minimum, plan to see back in the office in April, 2025 at the time of your next mammogram.  If you do not hear from Andie by next October, please give her a call at [phone].

## 2024-04-10 NOTE — ASSESSMENT & PLAN NOTE
She has heterogeneously dense breast tissue.  No evidence of disease on clinical exam or follow-up sonographic imaging completed today.  She will be due for annual mammography in October/November 2024 along with a left breast ultrasound.  We did discuss potential strategies to help her complete this imaging given her acute posttraumatic stress disorder related to reported subluxation of her left clavicle with her last mammogram at an outside facility.  She will consider follow-up breast MRI in the future but understands that this may be less sensitive than mammography.  We will discuss timing of a future MRI at her visit in 6 months.

## 2024-04-10 NOTE — ASSESSMENT & PLAN NOTE
Her only family history of breast cancer is a paternal aunt who was diagnosed postmenopausal at 70.  She has not pursued genetic testing at this point which is not unreasonable.  She does understand that if any point she wishes to pursue genetic testing she can still give our office a call we will assist with an appointment with our genetic counseling team.

## 2024-04-10 NOTE — ASSESSMENT & PLAN NOTE
According to the Tyrer-Cuzick model, she has an 18% elevated risk for breast cancer.  Given the new legislation in the [facility] she is a candidate for breast MRI given her heterogeneously dense breast tissue and family history of breast cancer.  She was not interested in chemoprevention given the potential side effects.  She will be due for annual mammographic imaging and a follow-up left breast ultrasound in fall, 2024.  Plan to see her back in the office at that time to discuss timing of breast MRI should she wish to pursue supplemental screening.

## 2024-05-07 ENCOUNTER — TRANSCRIBE ORDERS (OUTPATIENT)
Dept: SCHEDULING | Age: 39
End: 2024-05-07

## 2024-05-07 DIAGNOSIS — R22.1 LOCALIZED SWELLING, MASS AND LUMP, NECK: Primary | ICD-10-CM

## 2024-05-15 ENCOUNTER — APPOINTMENT (OUTPATIENT)
Dept: LAB | Age: 39
End: 2024-05-15
Attending: FAMILY MEDICINE
Payer: COMMERCIAL

## 2024-05-15 ENCOUNTER — TRANSCRIBE ORDERS (OUTPATIENT)
Dept: LAB | Age: 39
End: 2024-05-15

## 2024-05-15 ENCOUNTER — HOSPITAL ENCOUNTER (OUTPATIENT)
Dept: RADIOLOGY | Age: 39
Discharge: HOME | End: 2024-05-15
Attending: SURGERY
Payer: COMMERCIAL

## 2024-05-15 ENCOUNTER — TRANSCRIBE ORDERS (OUTPATIENT)
Dept: RADIOLOGY | Age: 39
End: 2024-05-15

## 2024-05-15 DIAGNOSIS — E04.1 NONTOXIC SINGLE THYROID NODULE: ICD-10-CM

## 2024-05-15 DIAGNOSIS — Z00.00 ENCOUNTER FOR GENERAL ADULT MEDICAL EXAMINATION WITHOUT ABNORMAL FINDINGS: ICD-10-CM

## 2024-05-15 DIAGNOSIS — E04.1 NONTOXIC SINGLE THYROID NODULE: Primary | ICD-10-CM

## 2024-05-15 DIAGNOSIS — Z00.00 ENCOUNTER FOR GENERAL ADULT MEDICAL EXAMINATION WITHOUT ABNORMAL FINDINGS: Primary | ICD-10-CM

## 2024-05-15 DIAGNOSIS — R22.1 LOCALIZED SWELLING, MASS AND LUMP, NECK: ICD-10-CM

## 2024-05-15 LAB
ALBUMIN SERPL-MCNC: 4.2 G/DL (ref 3.5–5.7)
ALP SERPL-CCNC: 43 IU/L (ref 34–125)
ALT SERPL-CCNC: 14 IU/L (ref 7–52)
ANION GAP SERPL CALC-SCNC: 9 MEQ/L (ref 3–15)
AST SERPL-CCNC: 17 IU/L (ref 13–39)
BASOPHILS # BLD: 0.05 K/UL (ref 0.01–0.1)
BASOPHILS NFR BLD: 0.8 %
BILIRUB SERPL-MCNC: 0.4 MG/DL (ref 0.3–1.2)
BUN SERPL-MCNC: 15 MG/DL (ref 7–25)
CALCIUM SERPL-MCNC: 9.4 MG/DL (ref 8.6–10.3)
CHLORIDE SERPL-SCNC: 101 MEQ/L (ref 98–107)
CHOLEST SERPL-MCNC: 201 MG/DL
CO2 SERPL-SCNC: 28 MEQ/L (ref 21–31)
CREAT SERPL-MCNC: 0.9 MG/DL (ref 0.6–1.2)
DIFFERENTIAL METHOD BLD: ABNORMAL
EGFRCR SERPLBLD CKD-EPI 2021: >60 ML/MIN/1.73M*2
EOSINOPHIL # BLD: 0.1 K/UL (ref 0.04–0.36)
EOSINOPHIL NFR BLD: 1.6 %
ERYTHROCYTE [DISTWIDTH] IN BLOOD BY AUTOMATED COUNT: 13.4 % (ref 11.7–14.4)
GLUCOSE SERPL-MCNC: 83 MG/DL (ref 70–99)
HCT VFR BLD AUTO: 44.4 % (ref 35–45)
HDLC SERPL-MCNC: 55 MG/DL
HDLC SERPL: 3.7 {RATIO}
HGB BLD-MCNC: 13.9 G/DL (ref 11.8–15.7)
IMM GRANULOCYTES # BLD AUTO: 0.02 K/UL (ref 0–0.08)
IMM GRANULOCYTES NFR BLD AUTO: 0.3 %
LDLC SERPL CALC-MCNC: 120 MG/DL
LYMPHOCYTES # BLD: 1.92 K/UL (ref 1.2–3.5)
LYMPHOCYTES NFR BLD: 30.7 %
MCH RBC QN AUTO: 28.8 PG (ref 28–33.2)
MCHC RBC AUTO-ENTMCNC: 31.3 G/DL (ref 32.2–35.5)
MCV RBC AUTO: 92.1 FL (ref 83–98)
MONOCYTES # BLD: 0.56 K/UL (ref 0.28–0.8)
MONOCYTES NFR BLD: 9 %
NEUTROPHILS # BLD: 3.6 K/UL (ref 1.7–7)
NEUTS SEG NFR BLD: 57.6 %
NONHDLC SERPL-MCNC: 146 MG/DL
NRBC BLD-RTO: 0 %
PDW BLD AUTO: 10.9 FL (ref 9.4–12.3)
PLATELET # BLD AUTO: 346 K/UL (ref 150–369)
POTASSIUM SERPL-SCNC: 4.5 MEQ/L (ref 3.5–5.1)
PROT SERPL-MCNC: 7.3 G/DL (ref 6–8.2)
RBC # BLD AUTO: 4.82 M/UL (ref 3.93–5.22)
SODIUM SERPL-SCNC: 138 MEQ/L (ref 136–145)
TRIGL SERPL-MCNC: 132 MG/DL
TSH SERPL DL<=0.05 MIU/L-ACNC: 1.52 MIU/L (ref 0.34–5.6)
WBC # BLD AUTO: 6.25 K/UL (ref 3.8–10.5)

## 2024-05-15 PROCEDURE — 76536 US EXAM OF HEAD AND NECK: CPT

## 2024-05-15 PROCEDURE — 84443 ASSAY THYROID STIM HORMONE: CPT

## 2024-05-15 PROCEDURE — 80061 LIPID PANEL: CPT

## 2024-05-15 PROCEDURE — 36415 COLL VENOUS BLD VENIPUNCTURE: CPT

## 2024-05-15 PROCEDURE — 85025 COMPLETE CBC W/AUTO DIFF WBC: CPT

## 2024-05-15 PROCEDURE — 80053 COMPREHEN METABOLIC PANEL: CPT

## 2024-10-29 ENCOUNTER — HOSPITAL ENCOUNTER (OUTPATIENT)
Dept: RADIOLOGY | Facility: HOSPITAL | Age: 39
Discharge: HOME | End: 2024-10-29
Attending: SURGERY
Payer: COMMERCIAL

## 2024-10-29 ENCOUNTER — OFFICE VISIT (OUTPATIENT)
Dept: SURGERY | Facility: CLINIC | Age: 39
End: 2024-10-29
Payer: COMMERCIAL

## 2024-10-29 VITALS
SYSTOLIC BLOOD PRESSURE: 126 MMHG | DIASTOLIC BLOOD PRESSURE: 91 MMHG | OXYGEN SATURATION: 99 % | RESPIRATION RATE: 16 BRPM | TEMPERATURE: 98.1 F | HEART RATE: 101 BPM

## 2024-10-29 DIAGNOSIS — Z91.89 AT RISK FOR BREAST CANCER: ICD-10-CM

## 2024-10-29 DIAGNOSIS — R92.333 HETEROGENEOUSLY DENSE TISSUE OF BOTH BREASTS ON MAMMOGRAPHY: ICD-10-CM

## 2024-10-29 DIAGNOSIS — Z80.3 FAMILY HISTORY OF MALIGNANT NEOPLASM OF BREAST: ICD-10-CM

## 2024-10-29 DIAGNOSIS — Z12.31 ENCOUNTER FOR SCREENING MAMMOGRAM FOR HIGH-RISK PATIENT: Primary | ICD-10-CM

## 2024-10-29 DIAGNOSIS — R92.8 CATEGORY 3 MAMMOGRAPHY RESULT WITH SHORT FOLLOW-UP INTERVAL SUGGESTED FOR PROBABLY BENIGN FINDING: ICD-10-CM

## 2024-10-29 PROCEDURE — 76642 ULTRASOUND BREAST LIMITED: CPT | Mod: LT

## 2024-10-29 PROCEDURE — G0279 TOMOSYNTHESIS, MAMMO: HCPCS

## 2024-10-29 PROCEDURE — 99213 OFFICE O/P EST LOW 20 MIN: CPT | Performed by: SURGERY

## 2024-10-29 ASSESSMENT — PAIN SCALES - GENERAL: PAINLEVEL_OUTOF10: 3

## 2024-10-29 NOTE — PATIENT INSTRUCTIONS
Plastics Dr. Carlos Woods [phone] 1st floor [address], [facility]    [address], [address], [place] [address]    Dr. Lit Ravi [phone] 1st floor [address], [facility]    Dr. Meri Mathew [phone] 1st floor [address], [facility]    Dr. Temitope Talbert [phone] [address], [place] [address] ·     Dr. Valerie Mercer [phone] [address], [place] [address]     Dr. Malik Andino [phone] [address], [facility] one, [address], [place] [address]    Dr. Gerardo Jordan [phone] [address], [address] [facility],   [place] [address]    Dr. KRISTY Sanchez [phone] [address], [place] [address]   [facility],   [address], [place], [address].       [facility],   [address] [place], [address].

## 2024-10-29 NOTE — LETTER
October 29, 2024     Shaye Jacques MD  [address]  [address]  [facility] [address]    Patient: Lupe Segal  YOB: 1985  Date of Visit: 10/29/2024      Dear Dr. Jacques:    Thank you for referring Lupe Segal to me for evaluation. Below are my notes for this consultation.    If you have questions, please do not hesitate to call me. I look forward to following your patient along with you.         Sincerely,        PAYTON Lomeli        CC: MD Vivian Bazzi Annette M, CRNP  10/29/2024 12:00 PM  Sign when Signing Visit  Lupe returns to the office today for follow-up of her previously noted fibroadenoma.  She offers no new breast complaints today.  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  She was able to use Ativan today prior to her mammogram and office visit and feels this is provided some relief of her anxiety.  She reports that her mother at age 73 was diagnosed with stage I breast cancer this past summer.  Her mother was treated with breast conservation surgery and aromatase inhibitor post radiation.  She has a family history of breast cancer in a paternal aunt. 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  She had a bilateral mammogram and left breast ultrasound done through the breast imaging center earlier today that was reviewed in the office.  No mammographic abnormalities were identified bilaterally.  A left breast ultrasound was performed and at 5:00, 6 cm from the nipple is a mildly lobulated circumscribed ovoid hypoechoic mass now measuring 0.9 x 0.4 x 0.5 cm which previously measured 1.1 x 0.6 x 0.9 cm and may represent a degenerated fibroadenoma.  There were no suspicious changes and one-year follow-up was recommended.  She has heterogeneously dense breast tissue.    There  are no changes in her past medical history, past surgical history, medications or allergies.    There are no changes in her family history.    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    Physical exam: Well-developed, well-nourished female in no apparent distress.  She is alert and oriented ×3 and her mood and affect are appropriate.  Her HEENT has no cervical or supraclavicular adenopathy.  There are no thyroid masses.  Her breast examination is symmetric.  There are no dominant masses, skin changes, nipple discharge or axillary adenopathy.  Assessment/Plan:  Problem List Items Addressed This Visit          Other    Heterogeneously dense tissue of both breasts on mammography     She has heterogeneously dense breast tissue and a family history of breast cancer on both sides of her family.  She would like to continue with annual diagnostic mammography given her anxiety.  She is considering breast MRI for supplemental screening.  No evidence of malignancy on clinical exam or mammographic imaging completed today.  At this point we will assist her with setting of a bilateral mammogram and office visit in 1 year.  She will contact me prior to this appointment for an Ativan prescription for her breast imaging.  An MRI prescription was placed in her chart and she will contact Andie, our surgical scheduler for assistance with the preauthorization scheduling in 6 months and we will contact her with those results if she pursues the supplemental screening.  As long as nothing is worrisome, we will see her back in the office in 1 year.         Relevant Orders    BI DIAGNOSTIC MAMMOGRAM BILATERAL (TOMOSYNTHESIS)    MRI BREAST BILATERAL WITH AND WITHOUT CONTRAST    At risk for breast cancer     When her mother's new diagnosis of breast cancer, I recalculated her Tyrer-Cuzick score and she is at 27.3%.  She would be a candidate for  breast MRI given her heterogeneously dense breast tissue, family history of breast cancer and elevated lifetime risk.  At a minimum, we will plan to see her back in the office in 1 year with a new baseline bilateral mammogram.  In the interim, an order was placed in her chart for a breast MRI and she will contact our surgical scheduler in 6 months for assistance with setting up a breast MRI.  We will contact her with those results to determine if short interval follow-up is recommended.         Relevant Orders    BI DIAGNOSTIC MAMMOGRAM BILATERAL (TOMOSYNTHESIS)    MRI BREAST BILATERAL WITH AND WITHOUT CONTRAST    Family history of malignant neoplasm of breast     She has a family history of breast cancer and a paternal aunt who was diagnosed postmenopausal at 70 and now her own mother has been diagnosed at age 73.  Her mother did not pursue genetic testing but Lupe did complete genetic testing this year with no pathogenic mutation.  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  She is up-to-date on her panel testing and no further intervention is required.         Relevant Orders    BI DIAGNOSTIC MAMMOGRAM BILATERAL (TOMOSYNTHESIS)    MRI BREAST BILATERAL WITH AND WITHOUT CONTRAST     Other Visit Diagnoses       Encounter for screening mammogram for high-risk patient    -  Primary        At a minimum, we will plan to see her back in the office in 1 year with new baseline bilateral mammogram.  In the interim, she will be sent a time to message to contact our office in April for assistance if she wishes to pursue a breast MRI.  She will contact the office prior to upcoming appointments for Ativan as needed.    PAYTON Lomeli

## 2024-10-29 NOTE — Clinical Note
She has a family history of breast cancer and dense breast tissue-can you please follow-up with her in April with the preauthorization for a breast MRI?  We see her at the [facility] office.

## 2024-10-29 NOTE — ASSESSMENT & PLAN NOTE
When her mother's new diagnosis of breast cancer, I recalculated her Tyrer-Cuzick score and she is at 27.3%.  She would be a candidate for breast MRI given her heterogeneously dense breast tissue, family history of breast cancer and elevated lifetime risk.  At a minimum, we will plan to see her back in the office in 1 year with a new baseline bilateral mammogram.  In the interim, an order was placed in her chart for a breast MRI and she will contact our surgical scheduler in 6 months for assistance with setting up a breast MRI.  We will contact her with those results to determine if short interval follow-up is recommended.

## 2024-10-29 NOTE — ASSESSMENT & PLAN NOTE
She has heterogeneously dense breast tissue and a family history of breast cancer on both sides of her family.  She would like to continue with annual diagnostic mammography given her anxiety.  She is considering breast MRI for supplemental screening.  No evidence of malignancy on clinical exam or mammographic imaging completed today.  At this point we will assist her with setting of a bilateral mammogram and office visit in 1 year.  She will contact me prior to this appointment for an Ativan prescription for her breast imaging.  An MRI prescription was placed in her chart and she will contact Andie, our surgical scheduler for assistance with the preauthorization scheduling in 6 months and we will contact her with those results if she pursues the supplemental screening.  As long as nothing is worrisome, we will see her back in the office in 1 year.

## 2024-10-29 NOTE — ASSESSMENT & PLAN NOTE
She has a family history of breast cancer and a paternal aunt who was diagnosed postmenopausal at 70 and now her own mother has been diagnosed at age 73.  Her mother did not pursue genetic testing but Lupe did complete genetic testing this year with no pathogenic mutation.  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  She is up-to-date on her panel testing and no further intervention is required.

## 2024-10-29 NOTE — PROGRESS NOTES
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  She offers no new breast complaints today.  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  She was able to use Ativan today prior to her mammogram and office visit and feels this is provided some relief of her anxiety.  She reports that her mother at age 73 was diagnosed with stage I breast cancer this past summer.  Her mother was treated with breast conservation surgery and aromatase inhibitor post radiation.  She has a family history of breast cancer in a paternal aunt. 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  She had a bilateral mammogram and left breast ultrasound done through the breast imaging center earlier today that was reviewed in the office.  No mammographic abnormalities were identified bilaterally.  A left breast ultrasound was performed and at 5:00, 6 cm from the nipple is a mildly lobulated circumscribed ovoid hypoechoic mass now measuring 0.9 x 0.4 x 0.5 cm which previously measured 1.1 x 0.6 x 0.9 cm and may represent a degenerated fibroadenoma.  There were no suspicious changes and one-year follow-up was recommended.  She has heterogeneously dense breast tissue.    There are no changes in her past medical history, past surgical history, medications or allergies.    There are no changes in her family history.    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    Physical exam: Well-developed, well-nourished female in no apparent distress.  She is alert and oriented ×3 and her mood and affect are appropriate.  Her HEENT has no cervical or supraclavicular adenopathy.  There are no thyroid masses.  Her breast examination is symmetric.  There are no dominant masses, skin  changes, nipple discharge or axillary adenopathy.  Assessment/Plan :  Problem List Items Addressed This Visit          Other    Heterogeneously dense tissue of both breasts on mammography     She has heterogeneously dense breast tissue and a family history of breast cancer on both sides of her family.  She would like to continue with annual diagnostic mammography given her anxiety.  She is considering breast MRI for supplemental screening.  No evidence of malignancy on clinical exam or mammographic imaging completed today.  At this point we will assist her with setting of a bilateral mammogram and office visit in 1 year.  She will contact me prior to this appointment for an Ativan prescription for her breast imaging.  An MRI prescription was placed in her chart and she will contact Andie, our surgical scheduler for assistance with the preauthorization scheduling in 6 months and we will contact her with those results if she pursues the supplemental screening.  As long as nothing is worrisome, we will see her back in the office in 1 year.         Relevant Orders    BI DIAGNOSTIC MAMMOGRAM BILATERAL (TOMOSYNTHESIS)    MRI BREAST BILATERAL WITH AND WITHOUT CONTRAST    At risk for breast cancer     When her mother's new diagnosis of breast cancer, I recalculated her Tyrer-Cuzick score and she is at 27.3%.  She would be a candidate for breast MRI given her heterogeneously dense breast tissue, family history of breast cancer and elevated lifetime risk.  At a minimum, we will plan to see her back in the office in 1 year with a new baseline bilateral mammogram.  In the interim, an order was placed in her chart for a breast MRI and she will contact our surgical scheduler in 6 months for assistance with setting up a breast MRI.  We will contact her with those results to determine if short interval follow-up is recommended.         Relevant Orders    BI DIAGNOSTIC MAMMOGRAM BILATERAL (TOMOSYNTHESIS)    MRI BREAST BILATERAL  WITH AND WITHOUT CONTRAST    Family history of malignant neoplasm of breast     She has a family history of breast cancer and a paternal aunt who was diagnosed postmenopausal at 70 and now her own mother has been diagnosed at age 73.  Her mother did not pursue genetic testing but Lupe did complete genetic testing this year with no pathogenic mutation.  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  She is up-to-date on her panel testing and no further intervention is required.         Relevant Orders    BI DIAGNOSTIC MAMMOGRAM BILATERAL (TOMOSYNTHESIS)    MRI BREAST BILATERAL WITH AND WITHOUT CONTRAST     Other Visit Diagnoses       Encounter for screening mammogram for high-risk patient    -  Primary        At a minimum, we will plan to see her back in the office in 1 year with new baseline bilateral mammogram.  In the interim, she will be sent a time to message to contact our office in April for assistance if she wishes to pursue a breast MRI.  She will contact the office prior to upcoming appointments for Ativan as needed.    PAYTON Lomeli

## 2025-03-12 ENCOUNTER — TELEPHONE (OUTPATIENT)
Dept: SURGERY | Facility: CLINIC | Age: 40
End: 2025-03-12

## 2025-03-12 NOTE — TELEPHONE ENCOUNTER
Lm for the pt she has been auth#[id number] for breast mri  Valid 3-11/5-9-2025  Number given to [facility] appt